# Patient Record
Sex: MALE | Race: WHITE | Employment: OTHER | URBAN - NONMETROPOLITAN AREA
[De-identification: names, ages, dates, MRNs, and addresses within clinical notes are randomized per-mention and may not be internally consistent; named-entity substitution may affect disease eponyms.]

---

## 2017-04-03 DIAGNOSIS — K22.70 BARRETT'S ESOPHAGUS WITHOUT DYSPLASIA: ICD-10-CM

## 2017-04-03 RX ORDER — OMEPRAZOLE 20 MG/1
CAPSULE, DELAYED RELEASE ORAL
Qty: 30 CAPSULE | Refills: 3 | Status: SHIPPED | OUTPATIENT
Start: 2017-04-03 | End: 2017-09-14 | Stop reason: SDUPTHER

## 2017-04-10 DIAGNOSIS — G47.00 INSOMNIA, UNSPECIFIED TYPE: ICD-10-CM

## 2017-04-10 RX ORDER — ALPRAZOLAM 0.5 MG/1
TABLET ORAL
Qty: 30 TABLET | Refills: 3 | Status: SHIPPED | OUTPATIENT
Start: 2017-04-10 | End: 2017-08-08 | Stop reason: SDUPTHER

## 2017-04-13 ENCOUNTER — OFFICE VISIT (OUTPATIENT)
Dept: FAMILY MEDICINE CLINIC | Age: 82
End: 2017-04-13

## 2017-04-13 VITALS
SYSTOLIC BLOOD PRESSURE: 118 MMHG | WEIGHT: 175.2 LBS | DIASTOLIC BLOOD PRESSURE: 76 MMHG | RESPIRATION RATE: 20 BRPM | TEMPERATURE: 98 F | HEART RATE: 74 BPM | HEIGHT: 67 IN | OXYGEN SATURATION: 98 % | BODY MASS INDEX: 27.5 KG/M2

## 2017-04-13 DIAGNOSIS — R97.20 ABNORMAL PSA: ICD-10-CM

## 2017-04-13 DIAGNOSIS — H26.9 CATARACT: ICD-10-CM

## 2017-04-13 DIAGNOSIS — N18.4 CHRONIC KIDNEY DISEASE (CKD), STAGE IV (SEVERE) (HCC): ICD-10-CM

## 2017-04-13 DIAGNOSIS — Z01.818 PREOP EXAMINATION: Primary | ICD-10-CM

## 2017-04-13 DIAGNOSIS — I48.0 PAROXYSMAL ATRIAL FIBRILLATION (HCC): ICD-10-CM

## 2017-04-13 DIAGNOSIS — I42.9 CARDIOMYOPATHY (HCC): ICD-10-CM

## 2017-04-13 PROCEDURE — 99213 OFFICE O/P EST LOW 20 MIN: CPT | Performed by: FAMILY MEDICINE

## 2017-04-13 ASSESSMENT — ENCOUNTER SYMPTOMS
CONSTIPATION: 0
SHORTNESS OF BREATH: 0
DIARRHEA: 0
BLOOD IN STOOL: 0

## 2017-08-08 DIAGNOSIS — G47.00 INSOMNIA, UNSPECIFIED TYPE: ICD-10-CM

## 2017-08-08 RX ORDER — ALPRAZOLAM 0.5 MG/1
TABLET ORAL
Qty: 30 TABLET | Refills: 3 | Status: SHIPPED | OUTPATIENT
Start: 2017-08-08 | End: 2017-12-05 | Stop reason: SDUPTHER

## 2017-08-14 ENCOUNTER — OFFICE VISIT (OUTPATIENT)
Dept: FAMILY MEDICINE CLINIC | Age: 82
End: 2017-08-14

## 2017-08-14 VITALS
WEIGHT: 171.4 LBS | DIASTOLIC BLOOD PRESSURE: 56 MMHG | BODY MASS INDEX: 26.9 KG/M2 | SYSTOLIC BLOOD PRESSURE: 120 MMHG | OXYGEN SATURATION: 97 % | HEIGHT: 67 IN | HEART RATE: 76 BPM

## 2017-08-14 DIAGNOSIS — I42.9 CARDIOMYOPATHY, UNSPECIFIED TYPE (HCC): ICD-10-CM

## 2017-08-14 DIAGNOSIS — N18.4 CHRONIC KIDNEY DISEASE (CKD), STAGE IV (SEVERE) (HCC): Primary | ICD-10-CM

## 2017-08-14 DIAGNOSIS — I50.9 CONGESTIVE HEART FAILURE, UNSPECIFIED CONGESTIVE HEART FAILURE CHRONICITY, UNSPECIFIED CONGESTIVE HEART FAILURE TYPE: ICD-10-CM

## 2017-08-14 DIAGNOSIS — I48.0 PAROXYSMAL ATRIAL FIBRILLATION (HCC): ICD-10-CM

## 2017-08-14 DIAGNOSIS — M1A.30X0 CHRONIC GOUT DUE TO RENAL IMPAIRMENT WITHOUT TOPHUS, UNSPECIFIED SITE: ICD-10-CM

## 2017-08-14 DIAGNOSIS — R97.20 ELEVATED PSA: ICD-10-CM

## 2017-08-14 PROCEDURE — 99213 OFFICE O/P EST LOW 20 MIN: CPT | Performed by: FAMILY MEDICINE

## 2017-08-14 ASSESSMENT — ENCOUNTER SYMPTOMS
CONSTIPATION: 0
DIARRHEA: 0
BLOOD IN STOOL: 0
SHORTNESS OF BREATH: 0

## 2017-08-14 ASSESSMENT — PATIENT HEALTH QUESTIONNAIRE - PHQ9
2. FEELING DOWN, DEPRESSED OR HOPELESS: 0
SUM OF ALL RESPONSES TO PHQ9 QUESTIONS 1 & 2: 0
SUM OF ALL RESPONSES TO PHQ QUESTIONS 1-9: 0
1. LITTLE INTEREST OR PLEASURE IN DOING THINGS: 0

## 2017-09-14 DIAGNOSIS — K22.70 BARRETT'S ESOPHAGUS WITHOUT DYSPLASIA: ICD-10-CM

## 2017-09-14 RX ORDER — OMEPRAZOLE 20 MG/1
CAPSULE, DELAYED RELEASE ORAL
Qty: 30 CAPSULE | Refills: 4 | Status: SHIPPED | OUTPATIENT
Start: 2017-09-14 | End: 2018-02-27 | Stop reason: SDUPTHER

## 2017-09-15 ENCOUNTER — HOSPITAL ENCOUNTER (OUTPATIENT)
Dept: SURGERY | Age: 82
Discharge: HOME | End: 2017-09-15
Attending: OPHTHALMOLOGY | Admitting: OPHTHALMOLOGY

## 2017-09-15 RX ORDER — PROPARACAINE HYDROCHLORIDE 5 MG/ML
1 SOLUTION/ DROPS OPHTHALMIC
Status: COMPLETED | OUTPATIENT
Start: 2017-09-15 | End: 2017-09-15

## 2017-09-15 RX ORDER — CYCLOPENTOLATE HYDROCHLORIDE 10 MG/ML
2 SOLUTION/ DROPS OPHTHALMIC EVERY 5 MIN PRN
Status: DISCONTINUED | OUTPATIENT
Start: 2017-09-15 | End: 2017-09-16 | Stop reason: HOSPADM

## 2017-09-15 RX ORDER — IBUPROFEN 200 MG
600 TABLET ORAL ONCE
Status: COMPLETED | OUTPATIENT
Start: 2017-09-15 | End: 2017-09-15

## 2017-09-15 RX ORDER — PHENYLEPHRINE HYDROCHLORIDE 100 MG/ML
1 SOLUTION/ DROPS OPHTHALMIC PRN
Status: DISCONTINUED | OUTPATIENT
Start: 2017-09-15 | End: 2017-09-16 | Stop reason: HOSPADM

## 2017-09-15 RX ADMIN — PROPARACAINE HYDROCHLORIDE 1 DROP: 5 SOLUTION/ DROPS OPHTHALMIC at 16:20

## 2017-09-15 RX ADMIN — CYCLOPENTOLATE HYDROCHLORIDE 2 DROP: 10 SOLUTION/ DROPS OPHTHALMIC at 15:37

## 2017-09-15 RX ADMIN — PHENYLEPHRINE HYDROCHLORIDE 1 DROP: 100 SOLUTION/ DROPS OPHTHALMIC at 15:23

## 2017-09-15 RX ADMIN — CYCLOPENTOLATE HYDROCHLORIDE 2 DROP: 10 SOLUTION/ DROPS OPHTHALMIC at 15:29

## 2017-09-15 RX ADMIN — PHENYLEPHRINE HYDROCHLORIDE 1 DROP: 100 SOLUTION/ DROPS OPHTHALMIC at 15:18

## 2017-09-15 RX ADMIN — Medication 600 MG: at 15:19

## 2017-09-15 RX ADMIN — CYCLOPENTOLATE HYDROCHLORIDE 2 DROP: 10 SOLUTION/ DROPS OPHTHALMIC at 15:21

## 2017-09-15 RX ADMIN — PHENYLEPHRINE HYDROCHLORIDE 1 DROP: 100 SOLUTION/ DROPS OPHTHALMIC at 15:33

## 2017-09-15 ASSESSMENT — PAIN SCALES - GENERAL: PAINLEVEL_OUTOF10: 0

## 2017-12-05 DIAGNOSIS — G47.00 INSOMNIA, UNSPECIFIED TYPE: ICD-10-CM

## 2017-12-05 RX ORDER — ALPRAZOLAM 0.5 MG/1
TABLET ORAL
Qty: 30 TABLET | Refills: 3 | Status: SHIPPED | OUTPATIENT
Start: 2017-12-05 | End: 2018-06-26 | Stop reason: DRUGHIGH

## 2017-12-11 RX ORDER — ALLOPURINOL 100 MG/1
TABLET ORAL
Qty: 90 TABLET | Refills: 3 | Status: SHIPPED | OUTPATIENT
Start: 2017-12-11 | End: 2018-01-25 | Stop reason: ALTCHOICE

## 2017-12-14 ENCOUNTER — OFFICE VISIT (OUTPATIENT)
Dept: FAMILY MEDICINE CLINIC | Age: 82
End: 2017-12-14

## 2017-12-14 VITALS
WEIGHT: 166.6 LBS | HEART RATE: 76 BPM | HEIGHT: 67 IN | OXYGEN SATURATION: 93 % | BODY MASS INDEX: 26.15 KG/M2 | SYSTOLIC BLOOD PRESSURE: 102 MMHG | DIASTOLIC BLOOD PRESSURE: 64 MMHG

## 2017-12-14 DIAGNOSIS — N18.4 CHRONIC KIDNEY DISEASE (CKD), STAGE IV (SEVERE) (HCC): Primary | ICD-10-CM

## 2017-12-14 DIAGNOSIS — R41.3 MEMORY LOSS: ICD-10-CM

## 2017-12-14 DIAGNOSIS — I50.20 SYSTOLIC CONGESTIVE HEART FAILURE, UNSPECIFIED CONGESTIVE HEART FAILURE CHRONICITY: ICD-10-CM

## 2017-12-14 DIAGNOSIS — I42.9 CARDIOMYOPATHY, UNSPECIFIED TYPE (HCC): ICD-10-CM

## 2017-12-14 DIAGNOSIS — I48.0 PAROXYSMAL ATRIAL FIBRILLATION (HCC): ICD-10-CM

## 2017-12-14 DIAGNOSIS — R97.20 ELEVATED PSA: ICD-10-CM

## 2017-12-14 DIAGNOSIS — K22.70 BARRETT'S ESOPHAGUS WITHOUT DYSPLASIA: ICD-10-CM

## 2017-12-14 PROCEDURE — 99214 OFFICE O/P EST MOD 30 MIN: CPT | Performed by: FAMILY MEDICINE

## 2017-12-14 RX ORDER — POLYETHYLENE GLYCOL 3350 17 G/17G
17 POWDER, FOR SOLUTION ORAL DAILY
COMMUNITY
End: 2018-01-25 | Stop reason: ALTCHOICE

## 2017-12-14 ASSESSMENT — ENCOUNTER SYMPTOMS
DIARRHEA: 0
NAUSEA: 0
SHORTNESS OF BREATH: 0

## 2017-12-14 NOTE — PROGRESS NOTES
Subjective:      Patient ID: Dina Tipton is a 80 y.o. male. Chief Complaint   Patient presents with    Atrial Fibrillation    Congestive Heart Failure    Chronic Kidney Disease   Pt is cold all of the time even when heat is set on 78. Wife thinks he has circulatory issues. Had fall recently. Blood vessel issues to right eye. Seen by eye drJn  Had laser surgery. Will have rpt laser on left eye for scar tissue. Last seen by cardio approx 5 mo ago. F/u appt planned next mo. No chest pain, sob or swelling noted. Has been on current meds. No recent exac of afib. Still on amiodarone. eduardo well. Has been having some issues w/ memory. Gradually getting worse. Family hx of dementia in sisters. eduardo meds for gerd. No sig symptoms. Last egd 1 yr ago  HPI    Review of Systems   Constitutional: Negative for fever. Respiratory: Negative for shortness of breath. Cardiovascular: Negative for chest pain, palpitations and leg swelling. Gastrointestinal: Negative for diarrhea and nausea. Musculoskeletal: Positive for gait problem. Psychiatric/Behavioral: Positive for decreased concentration. Objective:   Physical Exam   Constitutional: He is oriented to person, place, and time. He appears well-developed and well-nourished. HENT:   Head: Normocephalic and atraumatic. Eyes: Conjunctivae and EOM are normal.   Neck: No tracheal deviation present. Cardiovascular: Normal rate. Pulmonary/Chest: Effort normal. He has decreased breath sounds in the right lower field and the left lower field. Abdominal: Soft. There is no tenderness. Musculoskeletal: He exhibits no edema. Thoracic back: He exhibits deformity (kyphotic). Neurological: He is alert and oriented to person, place, and time. Skin: Skin is warm and dry. Psychiatric: He has a normal mood and affect.      /64   Pulse 76   Ht 5' 7\" (1.702 m)   Wt 166 lb 9.6 oz (75.6 kg)   SpO2 93%   BMI 26.09 kg/m²

## 2017-12-28 ENCOUNTER — OFFICE VISIT (OUTPATIENT)
Dept: FAMILY MEDICINE CLINIC | Age: 82
End: 2017-12-28

## 2017-12-28 VITALS
TEMPERATURE: 98 F | BODY MASS INDEX: 24.83 KG/M2 | DIASTOLIC BLOOD PRESSURE: 49 MMHG | SYSTOLIC BLOOD PRESSURE: 75 MMHG | WEIGHT: 158.2 LBS | HEART RATE: 72 BPM | OXYGEN SATURATION: 92 % | HEIGHT: 67 IN

## 2017-12-28 DIAGNOSIS — K52.9 ACUTE GASTROENTERITIS: ICD-10-CM

## 2017-12-28 DIAGNOSIS — I95.9 HYPOTENSION, UNSPECIFIED HYPOTENSION TYPE: Primary | ICD-10-CM

## 2017-12-28 DIAGNOSIS — I42.9 CARDIOMYOPATHY, UNSPECIFIED TYPE (HCC): ICD-10-CM

## 2017-12-28 DIAGNOSIS — J40 BRONCHITIS: ICD-10-CM

## 2017-12-28 DIAGNOSIS — N18.4 CHRONIC KIDNEY DISEASE (CKD), STAGE IV (SEVERE) (HCC): ICD-10-CM

## 2017-12-28 PROCEDURE — 99214 OFFICE O/P EST MOD 30 MIN: CPT | Performed by: FAMILY MEDICINE

## 2017-12-28 ASSESSMENT — ENCOUNTER SYMPTOMS
DIARRHEA: 1
SHORTNESS OF BREATH: 0
VOMITING: 1
COUGH: 1

## 2017-12-28 NOTE — PROGRESS NOTES
Subjective:      Patient ID: Lilly Postal is a 80 y.o. male. Chief Complaint   Patient presents with    Influenza    Diarrhea    Cough       Diarrhea    This is a new problem. The current episode started in the past 7 days. The problem has been unchanged. Associated symptoms include coughing and vomiting. Pertinent negatives include no fever. Cough   This is a new problem. The current episode started in the past 7 days. The cough is productive of sputum. Associated symptoms include nasal congestion and postnasal drip. Pertinent negatives include no fever or shortness of breath. He has tried nothing for the symptoms. last seen by cardio in July. Has had dec appetite and dec intake along w/ dec urine output. Review of Systems   Constitutional: Positive for appetite change and fatigue. Negative for fever. HENT: Positive for congestion and postnasal drip. Respiratory: Positive for cough. Negative for shortness of breath. Cardiovascular: Negative for palpitations. Gastrointestinal: Positive for diarrhea and vomiting. Neurological: Positive for dizziness. Negative for syncope.      Past Medical History:   Diagnosis Date    Arthritis     Atrial fibrillation (HCC) atrial fibrillation, pacer    Cardiomyopathy [facility]     Cataract     Chronic kidney disease (CKD), stage IV (severe) (HCC)     Congestive heart failure (CHF) ([facility] [facility]     Gout      Current Outpatient Prescriptions on File Prior to Visit   Medication Sig Dispense Refill    polyethylene glycol (MIRALAX) powder Take 17 g by mouth daily      allopurinol (ZYLOPRIM) 100 MG tablet TAKE 1 TABLET BY MOUTH EVERY DAY 90 tablet 3    ALPRAZolam (XANAX) 0.5 MG tablet TAKE 1 TABLET BY MOUTH NIGHTLY AS NEEDED FOR ANXIETY 30 tablet 3    omeprazole (PRILOSEC) 20 MG delayed release capsule TAKE 1 CAPSULE BY MOUTH DAILY 30 capsule 4    vitamin B-12 (CYANOCOBALAMIN) 500 MCG tablet Take 500 mcg by mouth daily      hydrALAZINE (APRESOLINE) 10 MG tablet Take 30 mg by mouth 3 times daily      isosorbide dinitrate (ISORDIL) 10 MG tablet Take 10 mg by mouth 3 times daily      metoprolol (LOPRESSOR) 100 MG tablet Take 100 mg by mouth 2 times daily      aspirin 325 MG tablet Take 325 mg by mouth daily      bimatoprost (LUMIGAN) 0.01 % SOLN ophthalmic drops Place 1 drop into the left eye nightly      furosemide (LASIX) 20 MG tablet Take 20 mg by mouth daily.  amiodarone (CORDARONE) 200 MG tablet Take 100 mg by mouth daily        No current facility-administered medications on file prior to visit. Past Surgical History:   Procedure Laterality Date    APPENDECTOMY      CARDIAC DEFIBRILLATOR PLACEMENT      CARDIAC SURGERY Left     5/13/17    CATARACT REMOVAL WITH IMPLANT Right 06/17/2017    [facility]    CHOLECYSTECTOMY      HERNIA REPAIR Bilateral     PACEMAKER INSERTION      TONSILLECTOMY       Objective:   Physical Exam   Constitutional: He is oriented to person, place, and time. He appears well-developed and well-nourished. HENT:   Head: Normocephalic and atraumatic. Mouth/Throat: Uvula is midline. Mucous membranes are dry. Eyes: Conjunctivae and EOM are normal.   Neck: No tracheal deviation present. Cardiovascular: Normal rate. Pulmonary/Chest: Effort normal. He has decreased breath sounds in the right lower field and the left lower field. He has rhonchi in the right lower field and the left lower field. He has rales in the right lower field and the left lower field. Abdominal: Soft. There is no tenderness. Musculoskeletal: He exhibits no edema. Thoracic back: He exhibits deformity (kyphotic). Neurological: He is alert and oriented to person, place, and time. Skin: Skin is warm and dry. Psychiatric: He has a normal mood and affect. BP (!) 75/49   Pulse 72   Temp 98 °F (36.7 °C) (Tympanic)   Ht 5' 7\" (1.702 m)   Wt 158 lb 3.2 oz (71.8 kg)   SpO2 92%   BMI 24.78 kg/m²    Assessment/Plan   1. Hypotension, unspecified hypotension type  bp low on multiple checks. Given hx of ckd and chf, as well as dec urine output and dec intake, rec ER for further eval and treatment. Patient typically on bp meds. 2. Acute gastroenteritis  W/ some emesis and diarrhea. Suspect contrib to above. Suspect volume depletion    3. Bronchitis  W/ cough. Abn breath sounds. May benefit from cxr as well    4. Chronic kidney disease (CKD), stage IV (severe) (HCC)  Given ongoing issues, rec eval at ER. 5. Cardiomyopathy, unspecified type ([address]  Last documented EF 33% per cardio note. May be contrib to above        [facility] ER and gave report. Pt sent to [facility] for further eval and treatment.

## 2018-01-08 ENCOUNTER — TELEPHONE (OUTPATIENT)
Dept: ADMINISTRATIVE | Age: 83
End: 2018-01-08

## 2018-01-25 ENCOUNTER — TELEPHONE (OUTPATIENT)
Dept: FAMILY MEDICINE CLINIC | Age: 83
End: 2018-01-25

## 2018-01-25 ENCOUNTER — OFFICE VISIT (OUTPATIENT)
Dept: FAMILY MEDICINE CLINIC | Age: 83
End: 2018-01-25

## 2018-01-25 VITALS
HEIGHT: 67 IN | DIASTOLIC BLOOD PRESSURE: 62 MMHG | HEART RATE: 79 BPM | OXYGEN SATURATION: 91 % | SYSTOLIC BLOOD PRESSURE: 86 MMHG

## 2018-01-25 DIAGNOSIS — I42.9 CARDIOMYOPATHY, UNSPECIFIED TYPE (HCC): ICD-10-CM

## 2018-01-25 DIAGNOSIS — I95.9 HYPOTENSION, UNSPECIFIED HYPOTENSION TYPE: ICD-10-CM

## 2018-01-25 DIAGNOSIS — I48.0 PAROXYSMAL ATRIAL FIBRILLATION (HCC): ICD-10-CM

## 2018-01-25 DIAGNOSIS — R26.9 GAIT ABNORMALITY: ICD-10-CM

## 2018-01-25 DIAGNOSIS — J18.9 PNEUMONIA DUE TO INFECTIOUS ORGANISM, UNSPECIFIED LATERALITY, UNSPECIFIED PART OF LUNG: Primary | ICD-10-CM

## 2018-01-25 DIAGNOSIS — R19.7 DIARRHEA, UNSPECIFIED TYPE: ICD-10-CM

## 2018-01-25 DIAGNOSIS — I50.20 SYSTOLIC CONGESTIVE HEART FAILURE, UNSPECIFIED CONGESTIVE HEART FAILURE CHRONICITY: ICD-10-CM

## 2018-01-25 DIAGNOSIS — N18.4 CHRONIC KIDNEY DISEASE (CKD), STAGE IV (SEVERE) (HCC): ICD-10-CM

## 2018-01-25 DIAGNOSIS — I50.22 HEART FAILURE, SYSTOLIC, CHRONIC (HCC): ICD-10-CM

## 2018-01-25 DIAGNOSIS — R29.6 FREQUENT FALLS: ICD-10-CM

## 2018-01-25 LAB
A/G RATIO: 0.6 (ref 1–2.5)
ALBUMIN SERPL-MCNC: 2.6 G/DL (ref 3.6–5)
ALP BLD-CCNC: 67 U/L (ref 46–116)
ALT SERPL-CCNC: 17 U/L (ref 12–78)
ANION GAP SERPL CALCULATED.3IONS-SCNC: 17.6 MMOL/L (ref 8–16)
AST SERPL-CCNC: 39 U/L (ref 12–36)
BANDED NEUTROPHILS ABSOLUTE COUNT: 17 % (ref 0–4)
BASOPHILS ABSOLUTE: 0 % (ref 0–1)
BASOPHILS RELATIVE PERCENT: 0.6 % (ref 0–1)
BILIRUB SERPL-MCNC: 1 MG/DL (ref 0–1.1)
BUN BLDV-MCNC: 50 MG/DL (ref 5–19)
CALCIUM SERPL-MCNC: 9 MG/DL (ref 8.4–10.2)
CHLORIDE BLD-SCNC: 101 MMOL/L (ref 99–111)
CO2: 24 MMOL/L (ref 21–33)
CREAT SERPL-MCNC: 3.4 MG/DL (ref 0.6–1.3)
EOSINOPHILS ABSOLUTE: 0 % (ref 0–5)
EOSINOPHILS RELATIVE PERCENT: 0.1 % (ref 0–5)
ERYTHROCYTE DISTRIBUTION WIDTH RBC RATIO: 14.1 % (ref 11.6–14.8)
GFR CALCULATED: 17
GLOBULIN: 4.2 G/DL (ref 2–3.5)
GLUCOSE BLD-MCNC: 142 MG/DL (ref 74–99)
GRANULOCYTE ABSOLUTE COUNT: 16.5 X(10)3/UL (ref 1.8–7.2)
GRANULOCYTE ABSOLUTE COUNT: 62 % (ref 40–70)
HCT VFR BLD CALC: 37.5 % (ref 37.4–53.8)
HEMOGLOBIN: 12.4 G/DL (ref 13.2–16.5)
IMMATURE GRANULOCYTES %: 1.7 % (ref 0–0.5)
LYMPHOCYTES ABSOLUTE: 1.7 X(10)3/UL (ref 1.1–2.7)
LYMPHOCYTES ABSOLUTE: 6 % (ref 15–45)
LYMPHOCYTES RELATIVE PERCENT: 7.1 % (ref 15–45)
MCH RBC QN AUTO: 32.9 PG (ref 27.7–33.3)
MCHC RBC AUTO-ENTMCNC: 33.1 G/DL (ref 32.8–35)
MCV RBC AUTO: 99.5 FL (ref 80.5–96.9)
MONOCYTES ABSOLUTE: 15 % (ref 0–12)
MONOCYTES RELATIVE PERCENT: 20.8 % (ref 0–12)
NEUTROPHILS/100 LEUKOCYTES: 69.7 % (ref 40–70)
PLATELETS: 268 X1000 (ref 129–332)
PLATELETS: NORMAL
POTASSIUM SERPL-SCNC: 4 MMOL/L (ref 3.4–5)
RBC # BLD: 3.77 X1000000 (ref 4.16–5.62)
RBC # BLD: NORMAL 10*6/UL
SODIUM BLD-SCNC: 139 MMOL/L (ref 136–146)
TOTAL PROTEIN: 6.8 G/DL (ref 6.3–8)
WBC # BLD: 23.7 X1000 (ref 5.4–9.9)

## 2018-01-25 PROCEDURE — 99214 OFFICE O/P EST MOD 30 MIN: CPT | Performed by: FAMILY MEDICINE

## 2018-01-25 ASSESSMENT — ENCOUNTER SYMPTOMS
BACK PAIN: 1
DIARRHEA: 1
SHORTNESS OF BREATH: 0
NAUSEA: 0
VOMITING: 0

## 2018-01-25 NOTE — PROGRESS NOTES
benefit from further rehab stay to dec risk of falls and further injury and help address the issues listed below    2. Diarrhea, unspecified type  Recent hospitalization and abx. Diarrhea as listed. Denies any testing for cdiff. Order for cdiff given. Has tried loperamide without relief  - C DIFF TOXIN/ANTIGEN; Future  - Comprehensive Metabolic Panel; Future  - CBC Auto Differential; Future    3. Frequent falls  See above. Diff w/ gait. Had falls at prior rehab facility as well. Sig fall risk at home. Would benefit from additional, more intense therapy. 4. Gait abnormality  See above    5. Chronic kidney disease (CKD), stage IV (severe) ([facility] [address]  Rpt labs. Baseline creat approx 2.4. Had been worse at times in hospital.  Worry about volume depletion given dry mucus membranes, diarrhea, dec appetite    6. Paroxysmal atrial fibrillation (HCC)  Cont meds. Seems to be in nsr today    7. Systolic congestive heart failure, unspecified congestive heart failure chronicity (HCC)  No sig edema today. Issues as listed above. 8. Cardiomyopathy, unspecified type ([facility] [address]  See above    10. Hypotension, unspecified hypotension type  Hold hydralazine for now. May need additional IVF as above. Likely contrib to weakness. See above.      spoke w/  at [facility] x 2. Exploring the option of additional stay at a rehab facility for the issues listed above. Janeen Gomez MA, am scribing for and in the presence of Karol Cloud MD. Electronically signed by Radha Floyd MA on 1/25/2018 at 2:20 PM    I, Karol Cloud MD  personally performed the services described in this documentation, as scribed by the user listed above in my presence, and it is both accurate and complete. I agree with the Chief Complaint, ROS, and Past Histories independently gathered by the clinical support staff and the remaining scribed note accurately describes my personal service to the patient.     1/25/2018    2:24 PM

## 2018-01-25 NOTE — TELEPHONE ENCOUNTER
Called and informed Martita Kathleen at [id number] [facility] of critical lab result. Will fax result to [phone] when we get all the results.

## 2018-02-27 DIAGNOSIS — K22.70 BARRETT'S ESOPHAGUS WITHOUT DYSPLASIA: ICD-10-CM

## 2018-02-27 RX ORDER — OMEPRAZOLE 20 MG/1
CAPSULE, DELAYED RELEASE ORAL
Qty: 30 CAPSULE | Refills: 4 | Status: SHIPPED | OUTPATIENT
Start: 2018-02-27 | End: 2018-04-10 | Stop reason: SDUPTHER

## 2018-03-27 ENCOUNTER — TELEPHONE (OUTPATIENT)
Dept: FAMILY MEDICINE CLINIC | Age: 83
End: 2018-03-27

## 2018-03-27 NOTE — TELEPHONE ENCOUNTER
Patient is being released from nursing home Saturday 03/31/2018 to go home she is going to try to care for him at home if she feels its too much he will need placed in a long term care facility in [address] [place] [address].  She wanted you to know that he still has C diff after 50 days of treatment and is still being treted

## 2018-03-27 NOTE — TELEPHONE ENCOUNTER
Wife wants to wait on the GI referral.   She wants to get him home and see if this last treatment plan even works. CHRISTOPHER was seeing him in the home care facility. She said that he'll be on an AB until the end of April. She will discuss this with you during his visit.

## 2018-04-10 ENCOUNTER — OFFICE VISIT (OUTPATIENT)
Dept: FAMILY MEDICINE CLINIC | Age: 83
End: 2018-04-10

## 2018-04-10 VITALS
BODY MASS INDEX: 23.7 KG/M2 | HEIGHT: 67 IN | HEART RATE: 79 BPM | DIASTOLIC BLOOD PRESSURE: 58 MMHG | OXYGEN SATURATION: 92 % | SYSTOLIC BLOOD PRESSURE: 90 MMHG | WEIGHT: 151 LBS

## 2018-04-10 DIAGNOSIS — R21 RASH: ICD-10-CM

## 2018-04-10 DIAGNOSIS — I48.0 PAROXYSMAL ATRIAL FIBRILLATION (HCC): Primary | ICD-10-CM

## 2018-04-10 DIAGNOSIS — I50.20 SYSTOLIC CONGESTIVE HEART FAILURE, UNSPECIFIED CONGESTIVE HEART FAILURE CHRONICITY: ICD-10-CM

## 2018-04-10 DIAGNOSIS — K22.70 BARRETT'S ESOPHAGUS WITHOUT DYSPLASIA: ICD-10-CM

## 2018-04-10 DIAGNOSIS — I42.9 CARDIOMYOPATHY, UNSPECIFIED TYPE (HCC): ICD-10-CM

## 2018-04-10 DIAGNOSIS — A04.72 C. DIFFICILE COLITIS: ICD-10-CM

## 2018-04-10 DIAGNOSIS — N18.4 CHRONIC KIDNEY DISEASE (CKD), STAGE IV (SEVERE) (HCC): ICD-10-CM

## 2018-04-10 PROCEDURE — 99214 OFFICE O/P EST MOD 30 MIN: CPT | Performed by: FAMILY MEDICINE

## 2018-04-10 RX ORDER — MIRTAZAPINE 30 MG/1
30 TABLET, FILM COATED ORAL NIGHTLY
COMMUNITY
End: 2018-06-04 | Stop reason: SDUPTHER

## 2018-04-10 RX ORDER — ISOSORBIDE DINITRATE 10 MG/1
10 TABLET ORAL 3 TIMES DAILY
Qty: 90 TABLET | Refills: 3 | Status: SHIPPED | OUTPATIENT
Start: 2018-04-10 | End: 2018-09-18

## 2018-04-10 RX ORDER — METRONIDAZOLE 500 MG/1
500 TABLET ORAL 2 TIMES DAILY
COMMUNITY
End: 2018-07-20 | Stop reason: ALTCHOICE

## 2018-04-10 RX ORDER — DONEPEZIL HYDROCHLORIDE 5 MG/1
5 TABLET, FILM COATED ORAL NIGHTLY
COMMUNITY
End: 2018-05-29 | Stop reason: SDUPTHER

## 2018-04-10 RX ORDER — AMMONIUM LACTATE 12 G/100G
LOTION TOPICAL
Qty: 1 BOTTLE | Refills: 1 | Status: SHIPPED | OUTPATIENT
Start: 2018-04-10 | End: 2018-11-08 | Stop reason: ALTCHOICE

## 2018-04-10 RX ORDER — OMEPRAZOLE 20 MG/1
CAPSULE, DELAYED RELEASE ORAL
Qty: 30 CAPSULE | Refills: 5 | Status: SHIPPED | OUTPATIENT
Start: 2018-04-10 | End: 2018-12-13 | Stop reason: SDUPTHER

## 2018-04-10 RX ORDER — POTASSIUM CHLORIDE 750 MG/1
10 TABLET, FILM COATED, EXTENDED RELEASE ORAL 3 TIMES DAILY
Qty: 60 TABLET | Refills: 3 | Status: SHIPPED | OUTPATIENT
Start: 2018-04-10 | End: 2019-01-04 | Stop reason: SDUPTHER

## 2018-04-10 RX ORDER — POTASSIUM CHLORIDE 750 MG/1
10 TABLET, FILM COATED, EXTENDED RELEASE ORAL 3 TIMES DAILY
COMMUNITY
End: 2018-04-10 | Stop reason: SDUPTHER

## 2018-04-10 ASSESSMENT — ENCOUNTER SYMPTOMS
DIARRHEA: 0
CONSTIPATION: 0
SHORTNESS OF BREATH: 0

## 2018-04-13 DIAGNOSIS — D64.9 LOW HEMOGLOBIN AND LOW HEMATOCRIT: Primary | ICD-10-CM

## 2018-04-13 LAB
ABSOLUTE BASO #: 0.1 10^3/UL
ABSOLUTE EOS #: 0.7 10^3/UL
ABSOLUTE LYMPH #: 1.2 10^3/UL
ABSOLUTE MONO #: 1 10^3/UL
ABSOLUTE NEUT #: 6.5 10^3/UL
AGE TIME: 83 YRS
ALBUMIN SERPL-MCNC: 2.7 G/DL
ALP BLD-CCNC: 41 U/L
ALT SERPL-CCNC: 10 U/L
ANION GAP SERPL CALCULATED.3IONS-SCNC: 12 MMOL/L
AST SERPL-CCNC: 15 U/L
BASOPHILS RELATIVE PERCENT: 1.3 %
BILIRUB SERPL-MCNC: 0.52 MG/DL
BUN BLDV-MCNC: 27 MG/DL
C DIFFICILE TOXIN, EIA: NEGATIVE
CALCIUM SERPL-MCNC: 8.3 MG/DL
CBC AUTO DIF: ABNORMAL
CHLORIDE BLD-SCNC: 106 MMOL/L
CO2: 25.5 MMOL/L
COMPREHENSIVE METABOLIC PANEL: ABNORMAL
CREAT SERPL-MCNC: 1.79 MG/DL
DIFFERENTIAL, MANUAL: ABNORMAL
EOSINOPHILS ABSOLUTE: 7.5 %
FERRITIN: 347 NG/ML
GFR AFRICAN AMERICAN: 44 ML/MIN
GFR NON-AFRICAN AMERICAN: 36 ML/MIN
GLUCOSE BLD-MCNC: 108 MG/DL
HCT VFR BLD CALC: 31.5 %
HEMOGLOBIN: 9.9 G/DL
IRON SATURATION: 36
IRON: 37 UG/DL
LEUKOCYTES, BLD: 9.5 K/UL
LYMPHOCYTES ABSOLUTE: 12.3 %
MCHC RBC AUTO-ENTMCNC: 29.4 PG
MCHC RBC AUTO-ENTMCNC: 31.5 G/DL
MCV RBC AUTO: 93.2 FL
MONOCYTES ABSOLUTE: 10.7 %
NEUTROPHILS SEGMENTED: 68.2 %
PDW BLD-RTO: 16.9 %
PLATELET # BLD: 311 K/UL
PMV BLD AUTO: 8 FL
POTASSIUM SERPL-SCNC: 4.4 MMOL/L
RBC # BLD: 3.39 M/UL
SEND TO IFC: NO
SODIUM BLD-SCNC: 143 MMOL/L
TOTAL IRON BINDING CAPACITY: 104 UG/DL
TOTAL PROTEIN: 7.1 G/DL
VITAMIN B-12: 837 PG/ML

## 2018-05-23 ENCOUNTER — OFFICE VISIT (OUTPATIENT)
Dept: FAMILY MEDICINE CLINIC | Age: 83
End: 2018-05-23

## 2018-05-23 VITALS
SYSTOLIC BLOOD PRESSURE: 134 MMHG | DIASTOLIC BLOOD PRESSURE: 82 MMHG | BODY MASS INDEX: 24.01 KG/M2 | HEIGHT: 67 IN | HEART RATE: 94 BPM | WEIGHT: 153 LBS | OXYGEN SATURATION: 91 %

## 2018-05-23 DIAGNOSIS — I48.0 PAROXYSMAL ATRIAL FIBRILLATION (HCC): Primary | ICD-10-CM

## 2018-05-23 DIAGNOSIS — R60.0 LOCALIZED EDEMA: ICD-10-CM

## 2018-05-23 DIAGNOSIS — I42.9 CARDIOMYOPATHY, UNSPECIFIED TYPE (HCC): ICD-10-CM

## 2018-05-23 DIAGNOSIS — N18.4 CHRONIC KIDNEY DISEASE (CKD), STAGE IV (SEVERE) (HCC): ICD-10-CM

## 2018-05-23 DIAGNOSIS — I82.403 ACUTE DEEP VEIN THROMBOSIS (DVT) OF BOTH LOWER EXTREMITIES, UNSPECIFIED VEIN (HCC): ICD-10-CM

## 2018-05-23 DIAGNOSIS — A04.72 C. DIFFICILE COLITIS: ICD-10-CM

## 2018-05-23 DIAGNOSIS — J44.9 CHRONIC OBSTRUCTIVE PULMONARY DISEASE, UNSPECIFIED COPD TYPE (HCC): ICD-10-CM

## 2018-05-23 PROCEDURE — 99213 OFFICE O/P EST LOW 20 MIN: CPT | Performed by: FAMILY MEDICINE

## 2018-05-23 RX ORDER — AMIODARONE HYDROCHLORIDE 100 MG/1
100 TABLET ORAL DAILY
COMMUNITY
End: 2018-05-30 | Stop reason: SDUPTHER

## 2018-05-23 RX ORDER — ACETAMINOPHEN 325 MG/1
650 TABLET ORAL EVERY 4 HOURS PRN
COMMUNITY
End: 2020-06-18

## 2018-05-23 RX ORDER — FUROSEMIDE 20 MG/1
20 TABLET ORAL DAILY
Qty: 30 TABLET | Refills: 0 | Status: SHIPPED | OUTPATIENT
Start: 2018-05-23 | End: 2018-06-25 | Stop reason: SDUPTHER

## 2018-05-23 RX ORDER — VANCOMYCIN HYDROCHLORIDE 125 MG/1
125 CAPSULE ORAL 4 TIMES DAILY
Qty: 40 CAPSULE | Refills: 0 | Status: SHIPPED | OUTPATIENT
Start: 2018-05-23 | End: 2018-06-02

## 2018-05-23 ASSESSMENT — ENCOUNTER SYMPTOMS
CHEST TIGHTNESS: 0
ABDOMINAL PAIN: 0
CONSTIPATION: 0
BLOOD IN STOOL: 0
DIARRHEA: 1
SHORTNESS OF BREATH: 0
COLOR CHANGE: 0

## 2018-05-29 ENCOUNTER — TELEPHONE (OUTPATIENT)
Dept: FAMILY MEDICINE CLINIC | Age: 83
End: 2018-05-29

## 2018-05-29 RX ORDER — DONEPEZIL HYDROCHLORIDE 5 MG/1
5 TABLET, FILM COATED ORAL NIGHTLY
Qty: 30 TABLET | Refills: 5 | Status: SHIPPED | OUTPATIENT
Start: 2018-05-29 | End: 2018-12-24 | Stop reason: SDUPTHER

## 2018-05-30 RX ORDER — AMIODARONE HYDROCHLORIDE 200 MG/1
TABLET ORAL
Qty: 15 TABLET | Refills: 0 | Status: SHIPPED | OUTPATIENT
Start: 2018-05-30 | End: 2018-06-29 | Stop reason: SDUPTHER

## 2018-06-04 ENCOUNTER — TELEPHONE (OUTPATIENT)
Dept: FAMILY MEDICINE CLINIC | Age: 83
End: 2018-06-04

## 2018-06-04 RX ORDER — MIRTAZAPINE 30 MG/1
30 TABLET, FILM COATED ORAL NIGHTLY
Qty: 30 TABLET | Refills: 3 | Status: ON HOLD | OUTPATIENT
Start: 2018-06-04 | End: 2018-09-24 | Stop reason: HOSPADM

## 2018-06-05 ENCOUNTER — TELEPHONE (OUTPATIENT)
Dept: FAMILY MEDICINE CLINIC | Age: 83
End: 2018-06-05

## 2018-06-12 LAB
ABSOLUTE BASO #: 0.2 10^3/UL
ABSOLUTE EOS #: 0.5 10^3/UL
ABSOLUTE LYMPH #: 1.2 10^3/UL
ABSOLUTE MONO #: 1 10^3/UL
ABSOLUTE NEUT #: 6.2 10^3/UL
BASOPHILS RELATIVE PERCENT: 2 %
CBC AUTO DIF: ABNORMAL
DIFFERENTIAL, MANUAL: ABNORMAL
EOSINOPHILS ABSOLUTE: 5.1 %
HCT VFR BLD CALC: 31.2 %
HEMOGLOBIN: 10.3 G/DL
LEUKOCYTES, BLD: 9.1 K/UL
LYMPHOCYTES ABSOLUTE: 13.7 %
MCHC RBC AUTO-ENTMCNC: 30.5 PG
MCHC RBC AUTO-ENTMCNC: 33 G/DL
MCV RBC AUTO: 92.5 FL
MONOCYTES ABSOLUTE: 11.1 %
NEUTROPHILS SEGMENTED: 68.1 %
PDW BLD-RTO: 17.9 %
PLATELET # BLD: 305 K/UL
PMV BLD AUTO: 8 FL
POTASSIUM SERPL-SCNC: 4.4 MMOL/L
RBC # BLD: 3.37 M/UL

## 2018-06-14 ENCOUNTER — TELEPHONE (OUTPATIENT)
Dept: FAMILY MEDICINE CLINIC | Age: 83
End: 2018-06-14

## 2018-06-14 DIAGNOSIS — N18.4 CHRONIC KIDNEY DISEASE (CKD), STAGE IV (SEVERE) (HCC): Primary | ICD-10-CM

## 2018-06-14 DIAGNOSIS — E87.8 POTASSIUM DISORDER: ICD-10-CM

## 2018-06-19 ENCOUNTER — TELEPHONE (OUTPATIENT)
Dept: FAMILY MEDICINE CLINIC | Age: 83
End: 2018-06-19

## 2018-06-21 ENCOUNTER — OFFICE VISIT (OUTPATIENT)
Dept: FAMILY MEDICINE CLINIC | Age: 83
End: 2018-06-21

## 2018-06-21 VITALS
BODY MASS INDEX: 24.71 KG/M2 | WEIGHT: 157.4 LBS | SYSTOLIC BLOOD PRESSURE: 110 MMHG | HEART RATE: 72 BPM | HEIGHT: 67 IN | DIASTOLIC BLOOD PRESSURE: 62 MMHG | OXYGEN SATURATION: 91 %

## 2018-06-21 DIAGNOSIS — N18.4 CHRONIC KIDNEY DISEASE (CKD), STAGE IV (SEVERE) (HCC): Primary | ICD-10-CM

## 2018-06-21 DIAGNOSIS — A04.72 C. DIFFICILE COLITIS: ICD-10-CM

## 2018-06-21 DIAGNOSIS — M1A.30X0 CHRONIC GOUT DUE TO RENAL IMPAIRMENT WITHOUT TOPHUS, UNSPECIFIED SITE: ICD-10-CM

## 2018-06-21 DIAGNOSIS — N39.0 URINARY TRACT INFECTION WITHOUT HEMATURIA, SITE UNSPECIFIED: ICD-10-CM

## 2018-06-21 LAB
A/G RATIO: 0.7 (ref 1–2.5)
ALBUMIN SERPL-MCNC: 2.9 G/DL (ref 3.6–5)
ALP BLD-CCNC: 50 U/L (ref 46–116)
ALT SERPL-CCNC: 11 U/L (ref 12–78)
ANION GAP SERPL CALCULATED.3IONS-SCNC: 15.7 MMOL/L (ref 8–16)
AST SERPL-CCNC: 14 U/L (ref 12–36)
BASOPHILS RELATIVE PERCENT: 0.3 % (ref 0–1)
BILIRUB SERPL-MCNC: 0.3 MG/DL (ref 0–1.1)
BUN BLDV-MCNC: 42 MG/DL (ref 5–19)
CALCIUM SERPL-MCNC: 8.6 MG/DL (ref 8.4–10.2)
CHLORIDE BLD-SCNC: 107 MMOL/L (ref 99–111)
CO2: 21 MMOL/L (ref 21–33)
CREAT SERPL-MCNC: 2.2 MG/DL (ref 0.6–1.3)
EOSINOPHILS RELATIVE PERCENT: 0.1 % (ref 0–5)
ERYTHROCYTE DISTRIBUTION WIDTH RBC RATIO: 15.4 % (ref 11.6–14.8)
GFR CALCULATED: 29
GLOBULIN: 4.1 G/DL (ref 2–3.5)
GLUCOSE BLD-MCNC: 127 MG/DL (ref 74–99)
GRANULOCYTE ABSOLUTE COUNT: 10.7 X(10)3/UL (ref 1.8–7.2)
HCT VFR BLD CALC: 32 % (ref 37.4–53.8)
HEMOGLOBIN: 10.4 G/DL (ref 13.2–16.5)
IMMATURE GRANULOCYTES %: 2.2 % (ref 0–0.5)
LYMPHOCYTES ABSOLUTE: 1 X(10)3/UL (ref 1.1–2.7)
LYMPHOCYTES RELATIVE PERCENT: 7.6 % (ref 15–45)
MCH RBC QN AUTO: 30.9 PG (ref 27.7–33.3)
MCHC RBC AUTO-ENTMCNC: 32.5 G/DL (ref 32.8–35)
MCV RBC AUTO: 95 FL (ref 80.5–96.9)
MONOCYTES RELATIVE PERCENT: 7.1 % (ref 0–12)
NEUTROPHILS/100 LEUKOCYTES: 82.7 % (ref 40–70)
PLATELETS: 334 X1000 (ref 129–332)
POTASSIUM SERPL-SCNC: 3.5 MMOL/L (ref 3.4–5)
RBC # BLD: 3.37 X1000000 (ref 4.16–5.62)
SODIUM BLD-SCNC: 140 MMOL/L (ref 136–146)
TOTAL PROTEIN: 7 G/DL (ref 6.3–8)
WBC # BLD: 12.9 X1000 (ref 5.4–9.9)

## 2018-06-21 PROCEDURE — 99214 OFFICE O/P EST MOD 30 MIN: CPT | Performed by: FAMILY MEDICINE

## 2018-06-21 RX ORDER — PREDNISONE 20 MG/1
20 TABLET ORAL
COMMUNITY
Start: 2018-06-19 | End: 2018-08-01 | Stop reason: ALTCHOICE

## 2018-06-21 RX ORDER — CIPROFLOXACIN 250 MG/1
250 TABLET, FILM COATED ORAL 2 TIMES DAILY
Qty: 14 TABLET | Refills: 0 | Status: CANCELLED | OUTPATIENT
Start: 2018-06-21 | End: 2018-06-28

## 2018-06-21 RX ORDER — CEFDINIR 300 MG/1
300 CAPSULE ORAL DAILY
Qty: 10 CAPSULE | Refills: 0 | Status: SHIPPED | OUTPATIENT
Start: 2018-06-21 | End: 2018-07-01

## 2018-06-21 RX ORDER — COLCHICINE 0.6 MG/1
0.6 TABLET, FILM COATED ORAL DAILY
COMMUNITY
Start: 2018-06-19 | End: 2018-08-01

## 2018-06-21 ASSESSMENT — ENCOUNTER SYMPTOMS
CHEST TIGHTNESS: 0
DIARRHEA: 1
ABDOMINAL PAIN: 0
BLOOD IN STOOL: 0
COLOR CHANGE: 0
SHORTNESS OF BREATH: 0
CONSTIPATION: 0

## 2018-06-25 ENCOUNTER — TELEPHONE (OUTPATIENT)
Dept: FAMILY MEDICINE CLINIC | Age: 83
End: 2018-06-25

## 2018-06-25 DIAGNOSIS — F41.9 ANXIETY: Primary | ICD-10-CM

## 2018-06-25 DIAGNOSIS — I42.9 CARDIOMYOPATHY, UNSPECIFIED TYPE (HCC): ICD-10-CM

## 2018-06-25 RX ORDER — FUROSEMIDE 20 MG/1
20 TABLET ORAL DAILY
Qty: 30 TABLET | Refills: 0 | Status: SHIPPED | OUTPATIENT
Start: 2018-06-25 | End: 2018-07-18 | Stop reason: SDUPTHER

## 2018-06-26 PROBLEM — F41.9 ANXIETY: Status: ACTIVE | Noted: 2018-06-26

## 2018-06-26 LAB
ABSOLUTE BASO #: 0 10^3/UL
ABSOLUTE EOS #: 0 10^3/UL
ABSOLUTE LYMPH #: 2 10^3/UL
ABSOLUTE MONO #: 0.4 10^3/UL
ABSOLUTE NEUT #: 7.7 10^3/UL
AGE TIME: 84 YRS
ALBUMIN SERPL-MCNC: 3 G/DL
ALP BLD-CCNC: 35 U/L
ALT SERPL-CCNC: 14 U/L
ANION GAP SERPL CALCULATED.3IONS-SCNC: 10 MMOL/L
AST SERPL-CCNC: 19 U/L
BANDED NEUTROPHILS ABSOLUTE COUNT: 2 %
BASOPHILS RELATIVE PERCENT: 0 %
BILIRUB SERPL-MCNC: 0.36 MG/DL
BLASTS ABSOLUTE COUNT: 0 %
BUN BLDV-MCNC: 32 MG/DL
CALCIUM SERPL-MCNC: 8.4 MG/DL
CBC AUTO DIF: ABNORMAL
CHLORIDE BLD-SCNC: 111 MMOL/L
CO2: 25.8 MMOL/L
COMPREHENSIVE METABOLIC PANEL: ABNORMAL
CREAT SERPL-MCNC: 1.61 MG/DL
DIFFERENTIAL, MANUAL: ABNORMAL
EOSINOPHILS ABSOLUTE: 0 %
GFR AFRICAN AMERICAN: 50 ML/MIN
GFR NON-AFRICAN AMERICAN: 41 ML/MIN
GLUCOSE BLD-MCNC: 128 MG/DL
HCT VFR BLD CALC: 34.6 %
HEMOGLOBIN: 11.4 G/DL
LEUKOCYTES, BLD: 10.6 K/UL
LYMPHOCYTES ABSOLUTE: 19 %
LYMPHS ABSOLUTE COUNT LARGE: 2 %
MCHC RBC AUTO-ENTMCNC: 30.8 PG
MCHC RBC AUTO-ENTMCNC: 33.1 G/DL
MCV RBC AUTO: 93 FL
METAMYELOCYTES: 0 %
MONOCYTES ABSOLUTE: 4 %
MYELOCYTES ABSOLUTE COUNT: 0 %
NEUTROPHILS SEGMENTED: 73 %
NUCLEATED RED BLOOD CELLS: 0 %
PDW BLD-RTO: 16.2 %
PLATELET # BLD: 408 K/UL
PMV BLD AUTO: 8 FL
POTASSIUM SERPL-SCNC: 4 MMOL/L
RBC # BLD: 3.72 M/UL
SODIUM BLD-SCNC: 147 MMOL/L
TOTAL PROTEIN: 6.3 G/DL

## 2018-06-26 RX ORDER — ALPRAZOLAM 0.5 MG/1
0.5 TABLET ORAL 2 TIMES DAILY PRN
Qty: 60 TABLET | Refills: 0 | Status: SHIPPED | OUTPATIENT
Start: 2018-06-26 | End: 2018-07-18 | Stop reason: SDUPTHER

## 2018-07-03 LAB
A/G RATIO: 0.8 (ref 1–2.5)
ALBUMIN SERPL-MCNC: 2.9 G/DL (ref 3.6–5)
ALP BLD-CCNC: 39 U/L (ref 46–116)
ALT SERPL-CCNC: 15 U/L (ref 12–78)
ANION GAP SERPL CALCULATED.3IONS-SCNC: 10.5 MMOL/L (ref 8–16)
AST SERPL-CCNC: 19 U/L (ref 12–36)
BASOPHILS RELATIVE PERCENT: 0.5 % (ref 0–1)
BILIRUB SERPL-MCNC: 0.3 MG/DL (ref 0–1.1)
BUN BLDV-MCNC: 21 MG/DL (ref 5–19)
CALCIUM SERPL-MCNC: 8.2 MG/DL (ref 8.4–10.2)
CHLORIDE BLD-SCNC: 109 MMOL/L (ref 99–111)
CO2: 27 MMOL/L (ref 21–33)
CREAT SERPL-MCNC: 1.7 MG/DL (ref 0.6–1.3)
EOSINOPHILS RELATIVE PERCENT: 2.2 % (ref 0–5)
ERYTHROCYTE DISTRIBUTION WIDTH RBC RATIO: 17.2 % (ref 11.6–14.8)
GFR CALCULATED: 39
GLOBULIN: 3.6 G/DL (ref 2–3.5)
GLUCOSE BLD-MCNC: 110 MG/DL (ref 74–99)
GRANULOCYTE ABSOLUTE COUNT: 6.3 X(10)3/UL (ref 1.8–7.2)
HCT VFR BLD CALC: 33.2 % (ref 37.4–53.8)
HEMOGLOBIN: 10.9 G/DL (ref 13.2–16.5)
IMMATURE GRANULOCYTES %: 0.8 % (ref 0–0.5)
LYMPHOCYTES ABSOLUTE: 1.7 X(10)3/UL (ref 1.1–2.7)
LYMPHOCYTES RELATIVE PERCENT: 18.5 % (ref 15–45)
MCH RBC QN AUTO: 31.4 PG (ref 27.7–33.3)
MCHC RBC AUTO-ENTMCNC: 32.8 G/DL (ref 32.8–35)
MCV RBC AUTO: 95.7 FL (ref 80.5–96.9)
MONOCYTES RELATIVE PERCENT: 9.5 % (ref 0–12)
NEUTROPHILS/100 LEUKOCYTES: 68.5 % (ref 40–70)
PLATELETS: 323 X1000 (ref 129–332)
POTASSIUM SERPL-SCNC: 4.4 MMOL/L (ref 3.4–5)
RBC # BLD: 3.47 X1000000 (ref 4.16–5.62)
SODIUM BLD-SCNC: 142 MMOL/L (ref 136–146)
TOTAL PROTEIN: 6.5 G/DL (ref 6.3–8)
WBC # BLD: 9.3 X1000 (ref 5.4–9.9)

## 2018-07-06 LAB
C DIFFICILE TOXIN, EIA: NEGATIVE
SEND TO IFC: NO

## 2018-07-09 ENCOUNTER — TELEPHONE (OUTPATIENT)
Dept: FAMILY MEDICINE CLINIC | Age: 83
End: 2018-07-09

## 2018-07-09 NOTE — TELEPHONE ENCOUNTER
Pt is inpatient at [facility]. Wife concerned because Christopher Loza had diarrhea just horribly Friday and Saturday, she said that every single time he stood up, the diarrhea would just run down his leg. He kept messing the bed. Reports that every single time [facility] run his cultures, they're negative. Yesterday she called in the am to check on him. No diarrhea thru the night and she said he did make it to the dining room to eat. Was told that [facility] and [facility] do not use the same procedure according to Dr Raj Saleem when running the stool cultures. Reports that when he's finished with the Vanc, he'll be OK for 2-3 days with soft stool and then the diarrhea starts back up again. She doesn't want [facility] to do anymore stool cultures.   What do you suggest?

## 2018-07-10 LAB
CULTURE, STOOL: NORMAL
SEND TO IFC: NO

## 2018-07-10 RX ORDER — VANCOMYCIN HYDROCHLORIDE 125 MG/1
CAPSULE ORAL
Qty: 76 CAPSULE | Refills: 0 | Status: SHIPPED | OUTPATIENT
Start: 2018-07-10 | End: 2018-08-01 | Stop reason: ALTCHOICE

## 2018-07-16 ENCOUNTER — TELEPHONE (OUTPATIENT)
Dept: FAMILY MEDICINE CLINIC | Age: 83
End: 2018-07-16

## 2018-07-16 DIAGNOSIS — A04.72 C. DIFFICILE DIARRHEA: Primary | ICD-10-CM

## 2018-07-18 DIAGNOSIS — F41.9 ANXIETY: ICD-10-CM

## 2018-07-18 RX ORDER — ALPRAZOLAM 0.5 MG/1
0.5 TABLET ORAL NIGHTLY
Qty: 30 TABLET | Refills: 0
Start: 2018-07-18 | End: 2018-08-17

## 2018-07-20 ENCOUNTER — INITIAL CONSULT (OUTPATIENT)
Dept: GASTROENTEROLOGY | Age: 83
End: 2018-07-20

## 2018-07-20 VITALS
WEIGHT: 146.6 LBS | HEIGHT: 67 IN | BODY MASS INDEX: 23.01 KG/M2 | SYSTOLIC BLOOD PRESSURE: 100 MMHG | DIASTOLIC BLOOD PRESSURE: 80 MMHG

## 2018-07-20 DIAGNOSIS — R19.7 DIARRHEA, UNSPECIFIED TYPE: Primary | ICD-10-CM

## 2018-07-20 DIAGNOSIS — A04.72 COLITIS DUE TO CLOSTRIDIUM DIFFICILE: ICD-10-CM

## 2018-07-20 PROCEDURE — 99203 OFFICE O/P NEW LOW 30 MIN: CPT | Performed by: INTERNAL MEDICINE

## 2018-07-31 ENCOUNTER — TELEPHONE (OUTPATIENT)
Dept: FAMILY MEDICINE CLINIC | Age: 83
End: 2018-07-31

## 2018-08-01 ENCOUNTER — OFFICE VISIT (OUTPATIENT)
Dept: FAMILY MEDICINE CLINIC | Age: 83
End: 2018-08-01

## 2018-08-01 VITALS
HEIGHT: 67 IN | WEIGHT: 149 LBS | HEART RATE: 88 BPM | BODY MASS INDEX: 23.39 KG/M2 | OXYGEN SATURATION: 91 % | DIASTOLIC BLOOD PRESSURE: 62 MMHG | SYSTOLIC BLOOD PRESSURE: 98 MMHG

## 2018-08-01 DIAGNOSIS — I82.813 CHRONIC SUPERFICIAL VENOUS THROMBOSIS OF BOTH LOWER EXTREMITIES: Primary | ICD-10-CM

## 2018-08-01 DIAGNOSIS — N18.4 CHRONIC KIDNEY DISEASE (CKD), STAGE IV (SEVERE) (HCC): ICD-10-CM

## 2018-08-01 DIAGNOSIS — I42.9 CARDIOMYOPATHY, UNSPECIFIED TYPE (HCC): ICD-10-CM

## 2018-08-01 DIAGNOSIS — I82.503 CHRONIC DEEP VEIN THROMBOSIS (DVT) OF BOTH LOWER EXTREMITIES, UNSPECIFIED VEIN (HCC): ICD-10-CM

## 2018-08-01 DIAGNOSIS — I48.0 PAROXYSMAL ATRIAL FIBRILLATION (HCC): ICD-10-CM

## 2018-08-01 DIAGNOSIS — A04.72 COLITIS DUE TO CLOSTRIDIUM DIFFICILE: ICD-10-CM

## 2018-08-01 DIAGNOSIS — I50.20 SYSTOLIC CONGESTIVE HEART FAILURE, UNSPECIFIED CONGESTIVE HEART FAILURE CHRONICITY: ICD-10-CM

## 2018-08-01 PROCEDURE — 36415 COLL VENOUS BLD VENIPUNCTURE: CPT | Performed by: FAMILY MEDICINE

## 2018-08-01 PROCEDURE — 99214 OFFICE O/P EST MOD 30 MIN: CPT | Performed by: FAMILY MEDICINE

## 2018-08-01 ASSESSMENT — ENCOUNTER SYMPTOMS
DIARRHEA: 0
SHORTNESS OF BREATH: 0

## 2018-08-01 NOTE — PROGRESS NOTES
activity: Yes     Partners: Female     Other Topics Concern    Not on file     Social History Narrative    No narrative on file       Prior to Visit Medications    Medication Sig Taking? Authorizing Provider   apixaban (ELIQUIS) 5 MG TABS tablet Take 5 mg by mouth 2 times daily Yes Historical Provider, MD   Fidaxomicin (DIFICID) 200 MG TABS tablet Take 200 mg by mouth 2 times daily Yes Yosi Loco MD   furosemide (LASIX) 20 MG tablet TAKE 1 TABLET BY MOUTH DAILY Yes Binta Smith MD   ALPRAZolam [employer]) 0.5 MG tablet Take 1 tablet by mouth nightly for 30 days. Farhad Smart MD   amiodarone (CORDARONE) 200 MG tablet TAKE 1/2 TABLET BY MOUTH EVERY DAY FOR HYPERTENSION Yes Binta Smith MD   mirtazapine (REMERON) 30 MG tablet Take 1 tablet by mouth nightly Yes Binta Smith MD   donepezil (ARICEPT) 5 MG tablet Take 1 tablet by mouth nightly Yes Binta Smith MD   aspirin 81 MG tablet Take 81 mg by mouth daily Yes Historical Provider, MD   acetaminophen (TYLENOL) 325 MG tablet Take 650 mg by mouth every 4 hours as needed for Pain Yes Historical Provider, MD   Multiple Vitamins-Minerals (GNP MENS MULTIPLUS PO) Take by mouth Yes Historical Provider, MD   isosorbide dinitrate (ISORDIL) 10 MG tablet Take 1 tablet by mouth 3 times daily Yes Binta Smith MD   potassium chloride (KLOR-CON) 10 MEQ extended release tablet Take 1 tablet by mouth 3 times daily  Patient taking differently: Take 10 mEq by mouth 2 times daily  Yes Binta Smith MD   metoprolol tartrate (LOPRESSOR) 25 MG tablet Take 1 tablet by mouth 2 times daily Yes Binta Smith MD   ammonium lactate (LAC-HYDRIN) 12 % lotion Apply topically daily.  Yes Binta Smith MD   omeprazole (PRILOSEC) 20 MG delayed release capsule TAKE 1 CAPSULE BY MOUTH DAILY Yes Binta Smith MD   vitamin B-12 (CYANOCOBALAMIN) 500 MCG tablet Take 500 mcg by mouth daily Yes Historical Provider, MD   bimatoprost (LUMIGAN) 0.01 % SOLN ophthalmic drops Place 1 drop into the left eye nightly Yes Historical Provider, MD       Allergies   Allergen Reactions    Warfarin Itching       OBJECTIVE:    BP 98/62   Pulse 88   Ht 5' 7\" (1.702 m)   Wt 149 lb (67.6 kg)   SpO2 91%   BMI 23.34 kg/m²     BP Readings from Last 2 Encounters:   08/01/18 98/62   07/20/18 100/80       Wt Readings from Last 3 Encounters:   08/01/18 149 lb (67.6 kg)   07/20/18 146 lb 9.6 oz (66.5 kg)   06/21/18 157 lb 6.4 oz (71.4 kg)       Physical Exam   Constitutional: He is oriented to person, place, and time. He appears well-developed and well-nourished. HENT:   Head: Normocephalic and atraumatic. Eyes: Conjunctivae and EOM are normal.   Neck: No tracheal deviation present. Cardiovascular: Normal rate and regular rhythm. Frequent extrasystoles are present. Exam reveals no gallop and no friction rub. Murmur heard. Systolic murmur is present with a grade of 2/6   Pulmonary/Chest: Effort normal and breath sounds normal.   Abdominal: Soft. There is no tenderness. Musculoskeletal: He exhibits edema. Neurological: He is alert and oriented to person, place, and time. Skin: Skin is warm and dry. Psychiatric: He has a normal mood and affect. ASSESSMENT/PLAN:    1. 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 Cont eliquis    2. Chronic deep vein thrombosis (DVT) of both lower extremities, unspecified vein (HCC)  See above    3. Chronic kidney disease (CKD), stage IV (severe) ([address]  Rpt labs. Was stable last check    4. Systolic congestive heart failure, unspecified congestive heart failure chronicity (HCC)  rec cardio f/u .      5. Paroxysmal atrial fibrillation (HCC)  F/u w/ cardio    6. Cardiomyopathy, unspecified type ([address]  F/u w/ cardio recommended. 7. Colitis due to Clostridium difficile  Improved w/ dificid. F/u w/ GI later this mo as sched    7. Dementia  Recommend NOT driving at this time.   Consider formal driving

## 2018-08-01 NOTE — LETTER
[address]  Phone: [phone]  Fax: [phone]    Janeice Goodell, MD        August 1, 2018     Patient: Maurizio Mckinney   YOB: 1934   Date of Visit: 8/1/2018       To Whom It May Concern: It is my medical opinion that Giselle Solis requires a disability parking placard due to his current medical conditions. Duration of need: 5 years    If you have any questions or concerns, please don't hesitate to call.     Sincerely,        Janeice Goodell, MD

## 2018-08-02 LAB
A/G RATIO: 1.4 (ref 1.1–2.2)
ALBUMIN SERPL-MCNC: 3.6 G/DL (ref 3.4–5)
ALP BLD-CCNC: 44 U/L (ref 40–129)
ALT SERPL-CCNC: 9 U/L (ref 10–40)
ANION GAP SERPL CALCULATED.3IONS-SCNC: 13 MMOL/L (ref 3–16)
AST SERPL-CCNC: 19 U/L (ref 15–37)
BASOPHILS ABSOLUTE: 0.1 K/UL (ref 0–0.2)
BASOPHILS RELATIVE PERCENT: 0.8 %
BILIRUB SERPL-MCNC: 0.4 MG/DL (ref 0–1)
BUN BLDV-MCNC: 26 MG/DL (ref 7–20)
CALCIUM SERPL-MCNC: 8.8 MG/DL (ref 8.3–10.6)
CHLORIDE BLD-SCNC: 108 MMOL/L (ref 99–110)
CHOLESTEROL, TOTAL: 185 MG/DL (ref 0–199)
CO2: 24 MMOL/L (ref 21–32)
CREAT SERPL-MCNC: 2 MG/DL (ref 0.8–1.3)
EOSINOPHILS ABSOLUTE: 0.2 K/UL (ref 0–0.6)
EOSINOPHILS RELATIVE PERCENT: 3.1 %
GFR AFRICAN AMERICAN: 39
GFR NON-AFRICAN AMERICAN: 32
GLOBULIN: 2.6 G/DL
GLUCOSE BLD-MCNC: 113 MG/DL (ref 70–99)
HCT VFR BLD CALC: 35.2 % (ref 40.5–52.5)
HDLC SERPL-MCNC: 71 MG/DL (ref 40–60)
HEMOGLOBIN: 11.7 G/DL (ref 13.5–17.5)
LDL CHOLESTEROL CALCULATED: 84 MG/DL
LYMPHOCYTES ABSOLUTE: 1.1 K/UL (ref 1–5.1)
LYMPHOCYTES RELATIVE PERCENT: 14.8 %
MCH RBC QN AUTO: 32.3 PG (ref 26–34)
MCHC RBC AUTO-ENTMCNC: 33.2 G/DL (ref 31–36)
MCV RBC AUTO: 97.4 FL (ref 80–100)
MONOCYTES ABSOLUTE: 0.9 K/UL (ref 0–1.3)
MONOCYTES RELATIVE PERCENT: 11.9 %
NEUTROPHILS ABSOLUTE: 5.2 K/UL (ref 1.7–7.7)
NEUTROPHILS RELATIVE PERCENT: 69.4 %
PDW BLD-RTO: 17.6 % (ref 12.4–15.4)
PLATELET # BLD: 280 K/UL (ref 135–450)
PMV BLD AUTO: 8.6 FL (ref 5–10.5)
POTASSIUM SERPL-SCNC: 4.5 MMOL/L (ref 3.5–5.1)
RBC # BLD: 3.61 M/UL (ref 4.2–5.9)
SODIUM BLD-SCNC: 145 MMOL/L (ref 136–145)
T4 FREE: 1.2 NG/DL (ref 0.9–1.8)
TOTAL PROTEIN: 6.2 G/DL (ref 6.4–8.2)
TRIGL SERPL-MCNC: 148 MG/DL (ref 0–150)
TSH SERPL DL<=0.05 MIU/L-ACNC: 2.38 UIU/ML (ref 0.27–4.2)
VLDLC SERPL CALC-MCNC: 30 MG/DL
WBC # BLD: 7.5 K/UL (ref 4–11)

## 2018-08-10 ENCOUNTER — TELEPHONE (OUTPATIENT)
Dept: GASTROENTEROLOGY | Age: 83
End: 2018-08-10

## 2018-08-10 NOTE — TELEPHONE ENCOUNTER
Patient called in stated he has finish his medication on Saturday 8/4 10 days dose. Pt is feeling very weak and needs to know if he wants to start it back up or something else.

## 2018-08-11 NOTE — TELEPHONE ENCOUNTER
AN note says repeat c diff if still having significant diarrhea which I would say should be happening day and night unrelated to eating with more then 3/d. If he does have recurrent c diff, pretty sure we can to fecal transplants at [facility]. [facility] [place] done them so we should be able to also. Would need to get with Alicia Saini on this one. Stool comes from microbiome.

## 2018-08-13 ENCOUNTER — TELEPHONE (OUTPATIENT)
Dept: FAMILY MEDICINE CLINIC | Age: 83
End: 2018-08-13

## 2018-08-13 DIAGNOSIS — R19.7 DIARRHEA, UNSPECIFIED TYPE: Primary | ICD-10-CM

## 2018-08-13 NOTE — TELEPHONE ENCOUNTER
Pt wife called very upset that she did not get a response on Friday for her , she feels that she should have gotten an answer and medications sent to pharmacy for his C-diff. Pt wife states that she is going to call the PCP and will be going elsewhere for any further medical care.

## 2018-08-13 NOTE — TELEPHONE ENCOUNTER
Could try another round, but doubt will be successful long term.   Will need back to GI for other options

## 2018-08-15 NOTE — TELEPHONE ENCOUNTER
Spoke with pt wife and she states she does not want to pursue care at this time and does not want to talk about fecal transplant, they will be finding another GI provider. The PCP prescribed another round of the Dificid for the pt without doing C-Diff testing, they are going to see if that works.

## 2018-08-21 ENCOUNTER — OFFICE VISIT (OUTPATIENT)
Dept: GASTROENTEROLOGY | Age: 83
End: 2018-08-21

## 2018-08-21 VITALS
SYSTOLIC BLOOD PRESSURE: 90 MMHG | WEIGHT: 147.4 LBS | BODY MASS INDEX: 23.13 KG/M2 | DIASTOLIC BLOOD PRESSURE: 62 MMHG | HEIGHT: 67 IN

## 2018-08-21 DIAGNOSIS — A04.72 COLITIS DUE TO CLOSTRIDIUM DIFFICILE: Primary | ICD-10-CM

## 2018-08-21 DIAGNOSIS — R19.7 DIARRHEA OF PRESUMED INFECTIOUS ORIGIN: ICD-10-CM

## 2018-08-21 PROCEDURE — 99213 OFFICE O/P EST LOW 20 MIN: CPT | Performed by: INTERNAL MEDICINE

## 2018-08-21 RX ORDER — ALPRAZOLAM 0.5 MG/1
0.5 TABLET ORAL 2 TIMES DAILY
COMMUNITY
End: 2018-09-07 | Stop reason: SDUPTHER

## 2018-08-21 RX ORDER — MIRTAZAPINE 30 MG/1
30 TABLET, FILM COATED ORAL NIGHTLY
Refills: 3 | COMMUNITY
Start: 2018-08-08 | End: 2018-10-30 | Stop reason: SDUPTHER

## 2018-08-21 NOTE — LETTER
COLONOSCOPY PREP INSTRUCTIONS  MlRALAX SPLIT DOSE   [facility] PHYSICIAN ENDOSCOPY    Your colonoscopy is scheduled on: [email or web address]   __Ash_  Arrival Time: __10:00AM_   DO NOT EAT OR DRINK (INCLUDING WATER) AFTER: Midnight _  's Name_[facility] [phone]  [facility]- [phone]    Keep these papers together; REVIEW ALL OF THEM AT LEAST 7 DAYS BEFORE THE PROCEDURE. Please complete all paperwork; including a current list of your medications, to avoid delays in the admission process. The following instructions must be followed in order to ensure your procedure has optimal outcomes. - KEEP YOUR APPOINTMENT. If for any reason, you are unable to keep your appointment, please notify us within 72 hours before your procedure. - You MUST have a responsible adult to drive you, who MUST remain at our facility the ENTIRE time. If not the procedure will be cancelled. You may go by taxi ONLY if you have a responsible adult with you. You may experience light headedness, dizziness etc., therefore you should have a responsible adult remain with you until the morning after your procedure. - Bring your insurance card and 's license. Call your insurance carrier to verify your benefits, and confirm that our facility is in your network, prior to the procedure date to ensure coverage. The facility name is listed as [facility] or [facility]  and the tax ID# is [id number].  - Due to the safety and privacy of our patients, only one visitor is allowed in the recovery area after the procedure. The center will not be responsible for lost valuables so please leave them at home. - Try to avoid seeds (strawberries, gonzález, and rye) for one week prior to your procedure.   - If you have questions after beginning the prep, call between 8:30 am & 4:30pm you will speak to a nurse or medical assistant, after 4:30pm you will reach the physician on call. - Hydration (body fluid) is the most important aspect of an effective and safe prep. If you are not drinking enough fluid you may experience cramping, nausea and dizziness. - Common side effects of the prep are nausea, bloating and shaking chills. If any of these occur, take a break from the prep (30 minutes to 1 hour) and then restart. If unable to complete after that notify the Physician as your procedure may need to be cancelled. Nausea medication or an alternative prep is sometimes called in.  - Do not leave home after starting the prep. Frequent, liquid stools may begin as soon as 15 minutes after the first bottle, although it could take up to 4 hours or longer for your first bowel movement. - Even if your stools are clear and watery in appearance, TAKE ALL OF THE PREP.  - Using baby wipes and nonprescription ointments, such as Desitin, will help with the irritation caused by frequent loose stools. - Due to unforeseen schedule changes, you may be asked to move your appointment time to an earlier/later time slot. - If you are scheduled at the hospital with anesthesia you will need to discontinue all liquids even water 4 hours prior to your procedure. To insure that your prep gives you the best results for your Colonoscopy, Please follow all directions closely. 3-5 days prior to your procedure:  1. Begin a low-fiber diet (no corn, dried beans, tomatoes, nuts, seeds, lettuce). 2. No bran or bulking agents. 3. Stop taking your multivitamin or iron supplements.   4. If you currently take Aggrenox, Dipyridamole, Persantine, Fondaparinux sodium (Arixtra), Dalteparin sodium Christo Carl), Warfarin (Coumadin), Clopidogrel (Plavix), Prasugrel (Effient), Enoxaparin, Lovenox, or Heparin, Cilostazol (Pletal), Rivoroxaban (Xarelto), Desirudin (Iprivask),

## 2018-08-24 ENCOUNTER — TELEPHONE (OUTPATIENT)
Dept: GASTROENTEROLOGY | Age: 83
End: 2018-08-24

## 2018-08-27 DIAGNOSIS — F41.9 ANXIETY: ICD-10-CM

## 2018-08-30 RX ORDER — ALPRAZOLAM 0.5 MG/1
TABLET ORAL
Qty: 60 TABLET | Refills: 0 | OUTPATIENT
Start: 2018-08-30 | End: 2018-09-29

## 2018-09-04 ENCOUNTER — TELEPHONE (OUTPATIENT)
Dept: GASTROENTEROLOGY | Age: 83
End: 2018-09-04

## 2018-09-05 ENCOUNTER — TELEPHONE (OUTPATIENT)
Dept: GASTROENTEROLOGY | Age: 83
End: 2018-09-05

## 2018-09-07 ENCOUNTER — TELEPHONE (OUTPATIENT)
Dept: GASTROENTEROLOGY | Age: 83
End: 2018-09-07

## 2018-09-07 DIAGNOSIS — F41.9 ANXIETY: Primary | ICD-10-CM

## 2018-09-07 NOTE — TELEPHONE ENCOUNTER
Spoke with patient's wife, informed her to discontinue VSL-3 and take fiber daily. Informed her if patient continues to have diarrhea over the weekend to call back on Monday and we would schedule a diagnostic colonoscopy. She was in agreement and understood.

## 2018-09-10 ENCOUNTER — TELEPHONE (OUTPATIENT)
Dept: GASTROENTEROLOGY | Age: 83
End: 2018-09-10

## 2018-09-10 DIAGNOSIS — F41.9 ANXIETY: ICD-10-CM

## 2018-09-10 RX ORDER — ALPRAZOLAM 0.5 MG/1
0.5 TABLET ORAL 2 TIMES DAILY PRN
Qty: 60 TABLET | Refills: 0 | Status: SHIPPED | OUTPATIENT
Start: 2018-09-10 | End: 2018-09-10 | Stop reason: SDUPTHER

## 2018-09-10 RX ORDER — ALPRAZOLAM 0.5 MG/1
0.5 TABLET ORAL 2 TIMES DAILY PRN
Qty: 60 TABLET | Refills: 0 | Status: ON HOLD | OUTPATIENT
Start: 2018-09-10 | End: 2018-09-24

## 2018-09-10 NOTE — TELEPHONE ENCOUNTER
Date of last refill of this med was not refilled by us. Their next appointment is 10/01/18, the last date patient was seen was 8/1/18. Does patient have medication agreement on file? No  Has drug screen been done in last 12 months if needed? no  Has [place] report been ran in last 90 days?  8/28/18

## 2018-09-11 ENCOUNTER — TELEPHONE (OUTPATIENT)
Dept: FAMILY MEDICINE CLINIC | Age: 83
End: 2018-09-11

## 2018-09-11 NOTE — TELEPHONE ENCOUNTER
Pt has had 5 bouts of diarrhea this morning. She's cleaned the bathroom twice. Seeing Dr Heath Edgar (GI)  and nothing seems to be helping. He's very weak. Barely able to walk. She wanted you to just admit him. I let her know that we can't do an admit like that. Advised to take him to the ED.

## 2018-09-12 DIAGNOSIS — A04.72 COLITIS DUE TO CLOSTRIDIUM DIFFICILE: Primary | ICD-10-CM

## 2018-09-12 RX ORDER — POLYETHYLENE GLYCOL 3350 17 G/17G
255 POWDER, FOR SOLUTION ORAL ONCE
Qty: 255 G | Refills: 0 | Status: SHIPPED | OUTPATIENT
Start: 2018-09-12 | End: 2018-09-12

## 2018-09-12 NOTE — LETTER
go by taxi ONLY if you have a responsible adult with you. You may experience light headedness, dizziness etc., therefore you should have a responsible adult remain with you until the morning after your procedure. - Bring your insurance card and 's license. Call your insurance carrier to verify your benefits, and confirm that our facility is in your network, prior to the procedure date to ensure coverage. The facility name is listed as [facility]  and the tax ID# is [id number].  - Due to the safety and privacy of our patients, only one visitor is allowed in the recovery area after the procedure. The center will not be responsible for lost valuables so please leave them at home. - Try to avoid seeds (strawberries, gonzález, and rye) for one week prior to your procedure. - If you have questions after beginning the prep, call between 8:30 am & 4:30pm you will speak to a nurse or medical assistant, after 4:30pm you will reach the physician on call. - Hydration (body fluid) is the most important aspect of an effective and safe prep. If you are not drinking enough fluid you may experience cramping, nausea and dizziness. - Common side effects of the prep are nausea, bloating and shaking chills. If any of these occur, take a break from the prep (30 minutes to 1 hour) and then restart. If unable to complete after that notify the Physician as your procedure may need to be cancelled. Nausea medication or an alternative prep is sometimes called in.  - Do not leave home after starting the prep. Frequent, liquid stools may begin as soon as 15 minutes after the first bottle, although it could take up to 4 hours or longer for your first bowel movement.   - Even if your stools are clear and watery in appearance, TAKE ALL OF THE PREP.  - Using baby wipes and nonprescription ointments, such as Desitin, will help with the irritation caused by frequent loose stools. - Due to unforeseen schedule changes, you may be asked to move your appointment time to an earlier/later time slot. To insure that your prep gives you the best results for your Colonoscopy, Please follow all directions closely. 3-5 days prior to your procedure:  1. Begin a low-fiber diet (no corn, dried beans, tomatoes, nuts, seeds, lettuce). 2. No bran or bulking agents. 3. Stop taking your multivitamin or iron supplements. 4. If you currently take Aggrenox, Dipyridamole, Persantine, Fondaparinux sodium (Arixtra), Dalteparin sodium [place]), Warfarin (Coumadin), Clopidogrel (Plavix), Prasugrel (Effient), Enoxaparin, Lovenox, or Heparin, Cilostazol (Pletal), Rivoroxaban (Xarelto), Desirudin (Iprivask), Cyclopentyltrazolopyrimide (Ticagrelor or Brilinta), Cangrelor (Mollie Holter), Dabigatran (Pradaxa), Apixaban (Eliquis), Edoxaban (Savaysa)you should have received instructions regarding if and when to discontinue the medication. If you have not, or do not clearly understand the instructions, please call the office for clarification (number listed above). 5. Drink plenty of fluid. 1 day prior to your procedure:  1. Do not eat any SOLID FOOD, beginning with breakfast drink clear liquids only, which includes: Chicken or Beef Broth, Coffee/tea (without milk or creamer) Gatorade/PowerAde (no red or purple), JeIl-O (no red or purple), All Soda (even dark cola), Sorbet/Popsicles (no red or purple), Water If you take Diabetic medications (insulin/oral medication)-reduce the amount by one half on the morning of prep. You may resume the meds once you begin eating again. You must drink 8oz of liquids every hour to avoid dehydration. 2. If you take Diabetic medications (insulin/oral medication) - reduce the amount by one half on morning of prep. You may resume the meds once you begin eating again.   3. Bowel Cleansing Prep 3:00pm Take 4 dulcolax tablets with a full glass of water   5:00pm Mix one bottle of Miralax (238gm) in one bottle of Gatorade (64oz). Shake until dissolved. Drink 8 oz every 15 minutes until you have finished half of the solution. MORNING OF PROCEDURE  1. __4:30AM__ (5 hours prior to the procedure) Drink the remaining portion of the solution 8 oz. every 15 minutes until completely finished, followed by 8 oz of any of the approved liquids. 2. Take your Blood pressure, Heart and Seizure medication the morning of the procedure with sips of water. 3. Bring inhalers with you. 4. Do not take your Diabetic medication the morning of procedure. [facility]  [facility] [place], [address] [facility]      Once you turn into the hospital, turn right (towards E.R.) go past the E.R., youll see a driveway on your left. Make that turn, the sign will say [facility] or Surgery entrance. [address] [facility]  Go through 2 sets of double doors, sign in at window, you will get registered there and they will send you up the hallway to the Endoscopy area. Dear Patient,      Kaleigh Dumont will receive a call from the [facility] pre-registration department prior to your GI procedure. This will help streamline your check-in process on the day of service. During this call a skilled associate will review your demographic and insurance benefits information. The associate will answer any question pertaining to your insurance contract, such as deductible/co-insurance/ co-pay or any other financial concerns. They may offer an amount that you could pay on the day of surgery but this is not a requirement.       Thank you for allowing [facility] to be part of your Health Care Team.

## 2018-09-12 NOTE — TELEPHONE ENCOUNTER
----- Message from Qi Cook MD sent at 9/12/2018 11:49 AM EDT -----  [id number] Jaleesa Hathaway to please go ahead and schedule a colonoscopy (with MAC). PCP is ok with holding Eliquis for 2 days prior. ----- Message -----  From: Savanah Mena MA  Sent: 9/12/2018  10:37 AM  To: MD Dr Luis Carlos Rosario, will you please look at this response by Dr Chinmay Roy? Is it ok to go ahead and schedule?  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 It would likely be okay to stop the eliquis  for 2 days prior to procedure and resume the evening after the procedure is done.  ----- Message -----  From: Savanah Mena MA  Sent: 9/10/2018   3:42 PM  To: Jeffy Cardenas MD    Pt needs to have a Colonoscopy as soon as possible, is it ok for the pt to d/c Eliquis for 2 days prior to the procedure.

## 2018-09-14 DIAGNOSIS — I42.9 CARDIOMYOPATHY, UNSPECIFIED TYPE (HCC): ICD-10-CM

## 2018-09-18 ENCOUNTER — TELEPHONE (OUTPATIENT)
Dept: GASTROENTEROLOGY | Age: 83
End: 2018-09-18

## 2018-09-18 ENCOUNTER — APPOINTMENT (OUTPATIENT)
Dept: GENERAL RADIOLOGY | Age: 83
DRG: 372 | End: 2018-09-18
Payer: COMMERCIAL

## 2018-09-18 ENCOUNTER — HOSPITAL ENCOUNTER (INPATIENT)
Age: 83
LOS: 8 days | Discharge: NURSING, REHAB OR LONG-TERM CARE | DRG: 372 | End: 2018-09-26
Attending: EMERGENCY MEDICINE | Admitting: INTERNAL MEDICINE
Payer: COMMERCIAL

## 2018-09-18 DIAGNOSIS — F41.9 ANXIETY: ICD-10-CM

## 2018-09-18 DIAGNOSIS — R62.7 FTT (FAILURE TO THRIVE) IN ADULT: ICD-10-CM

## 2018-09-18 DIAGNOSIS — R19.7 DIARRHEA OF PRESUMED INFECTIOUS ORIGIN: ICD-10-CM

## 2018-09-18 DIAGNOSIS — N17.9 ACUTE RENAL FAILURE, UNSPECIFIED ACUTE RENAL FAILURE TYPE (HCC): Primary | ICD-10-CM

## 2018-09-18 DIAGNOSIS — Z86.19 HX OF CLOSTRIDIUM DIFFICILE INFECTION: ICD-10-CM

## 2018-09-18 LAB
A/G RATIO: 1.1 (ref 1.1–2.2)
ALBUMIN SERPL-MCNC: 2.8 G/DL (ref 3.4–5)
ALP BLD-CCNC: 61 U/L (ref 40–129)
ALT SERPL-CCNC: 6 U/L (ref 10–40)
ANION GAP SERPL CALCULATED.3IONS-SCNC: 15 MMOL/L (ref 3–16)
ANISOCYTOSIS: ABNORMAL
AST SERPL-CCNC: 11 U/L (ref 15–37)
ATYPICAL LYMPHOCYTE RELATIVE PERCENT: 1 % (ref 0–6)
BANDED NEUTROPHILS RELATIVE PERCENT: 7 % (ref 0–7)
BASOPHILS ABSOLUTE: 0 K/UL (ref 0–0.2)
BASOPHILS RELATIVE PERCENT: 0 %
BILIRUB SERPL-MCNC: 0.8 MG/DL (ref 0–1)
BILIRUBIN URINE: NEGATIVE
BLOOD, URINE: NEGATIVE
BUN BLDV-MCNC: 50 MG/DL (ref 7–20)
C DIFFICILE TOXIN, EIA: ABNORMAL
CALCIUM SERPL-MCNC: 8.3 MG/DL (ref 8.3–10.6)
CHLORIDE BLD-SCNC: 108 MMOL/L (ref 99–110)
CLARITY: CLEAR
CO2: 22 MMOL/L (ref 21–32)
COLOR: YELLOW
CREAT SERPL-MCNC: 2.9 MG/DL (ref 0.8–1.3)
EOSINOPHILS ABSOLUTE: 0.3 K/UL (ref 0–0.6)
EOSINOPHILS RELATIVE PERCENT: 1 %
GFR AFRICAN AMERICAN: 25
GFR NON-AFRICAN AMERICAN: 21
GLOBULIN: 2.6 G/DL
GLUCOSE BLD-MCNC: 107 MG/DL (ref 70–99)
GLUCOSE URINE: NEGATIVE MG/DL
HCT VFR BLD CALC: 38.7 % (ref 40.5–52.5)
HEMOGLOBIN: 12.8 G/DL (ref 13.5–17.5)
INR BLD: 1.51 (ref 0.86–1.14)
KETONES, URINE: NEGATIVE MG/DL
LEUKOCYTE ESTERASE, URINE: NEGATIVE
LYMPHOCYTES ABSOLUTE: 2.1 K/UL (ref 1–5.1)
LYMPHOCYTES RELATIVE PERCENT: 7 %
MCH RBC QN AUTO: 31.7 PG (ref 26–34)
MCHC RBC AUTO-ENTMCNC: 33 G/DL (ref 31–36)
MCV RBC AUTO: 96 FL (ref 80–100)
MICROSCOPIC EXAMINATION: NORMAL
MONOCYTES ABSOLUTE: 0.8 K/UL (ref 0–1.3)
MONOCYTES RELATIVE PERCENT: 3 %
NEUTROPHILS ABSOLUTE: 23.3 K/UL (ref 1.7–7.7)
NEUTROPHILS RELATIVE PERCENT: 81 %
NITRITE, URINE: NEGATIVE
OCCULT BLOOD DIAGNOSTIC: ABNORMAL
PDW BLD-RTO: 16.5 % (ref 12.4–15.4)
PH UA: 5.5
PLATELET # BLD: 314 K/UL (ref 135–450)
PLATELET SLIDE REVIEW: ADEQUATE
PMV BLD AUTO: 8 FL (ref 5–10.5)
POTASSIUM SERPL-SCNC: 3.3 MMOL/L (ref 3.5–5.1)
PROTEIN UA: NEGATIVE MG/DL
PROTHROMBIN TIME: 17.2 SEC (ref 9.8–13)
RBC # BLD: 4.03 M/UL (ref 4.2–5.9)
SLIDE REVIEW: ABNORMAL
SODIUM BLD-SCNC: 145 MMOL/L (ref 136–145)
SPECIFIC GRAVITY UA: 1.02
TOTAL PROTEIN: 5.4 G/DL (ref 6.4–8.2)
TOXIC GRANULATION: PRESENT
URINE REFLEX TO CULTURE: NORMAL
URINE TYPE: NORMAL
UROBILINOGEN, URINE: 0.2 E.U./DL
VACUOLATED NEUTROPHILS: PRESENT
WBC # BLD: 26.5 K/UL (ref 4–11)

## 2018-09-18 PROCEDURE — 6370000000 HC RX 637 (ALT 250 FOR IP): Performed by: INTERNAL MEDICINE

## 2018-09-18 PROCEDURE — 2580000003 HC RX 258: Performed by: EMERGENCY MEDICINE

## 2018-09-18 PROCEDURE — 96361 HYDRATE IV INFUSION ADD-ON: CPT

## 2018-09-18 PROCEDURE — 85610 PROTHROMBIN TIME: CPT

## 2018-09-18 PROCEDURE — 87505 NFCT AGENT DETECTION GI: CPT

## 2018-09-18 PROCEDURE — 99285 EMERGENCY DEPT VISIT HI MDM: CPT

## 2018-09-18 PROCEDURE — 87324 CLOSTRIDIUM AG IA: CPT

## 2018-09-18 PROCEDURE — 93005 ELECTROCARDIOGRAM TRACING: CPT | Performed by: EMERGENCY MEDICINE

## 2018-09-18 PROCEDURE — 87046 STOOL CULTR AEROBIC BACT EA: CPT

## 2018-09-18 PROCEDURE — 1200000000 HC SEMI PRIVATE

## 2018-09-18 PROCEDURE — 80053 COMPREHEN METABOLIC PANEL: CPT

## 2018-09-18 PROCEDURE — G0328 FECAL BLOOD SCRN IMMUNOASSAY: HCPCS

## 2018-09-18 PROCEDURE — 71046 X-RAY EXAM CHEST 2 VIEWS: CPT

## 2018-09-18 PROCEDURE — 85025 COMPLETE CBC W/AUTO DIFF WBC: CPT

## 2018-09-18 PROCEDURE — 87449 NOS EACH ORGANISM AG IA: CPT

## 2018-09-18 PROCEDURE — 2580000003 HC RX 258: Performed by: INTERNAL MEDICINE

## 2018-09-18 PROCEDURE — 96360 HYDRATION IV INFUSION INIT: CPT

## 2018-09-18 PROCEDURE — 81003 URINALYSIS AUTO W/O SCOPE: CPT

## 2018-09-18 RX ORDER — SODIUM CHLORIDE 0.9 % (FLUSH) 0.9 %
10 SYRINGE (ML) INJECTION PRN
Status: DISCONTINUED | OUTPATIENT
Start: 2018-09-18 | End: 2018-09-26 | Stop reason: HOSPADM

## 2018-09-18 RX ORDER — AMIODARONE HYDROCHLORIDE 200 MG/1
200 TABLET ORAL DAILY
Status: DISCONTINUED | OUTPATIENT
Start: 2018-09-18 | End: 2018-09-26 | Stop reason: HOSPADM

## 2018-09-18 RX ORDER — POTASSIUM CHLORIDE 750 MG/1
10 TABLET, EXTENDED RELEASE ORAL 2 TIMES DAILY
Status: DISCONTINUED | OUTPATIENT
Start: 2018-09-18 | End: 2018-09-19

## 2018-09-18 RX ORDER — PANTOPRAZOLE SODIUM 40 MG/1
40 TABLET, DELAYED RELEASE ORAL
Status: DISCONTINUED | OUTPATIENT
Start: 2018-09-19 | End: 2018-09-18

## 2018-09-18 RX ORDER — DONEPEZIL HYDROCHLORIDE 5 MG/1
5 TABLET, FILM COATED ORAL NIGHTLY
Status: DISCONTINUED | OUTPATIENT
Start: 2018-09-18 | End: 2018-09-26 | Stop reason: HOSPADM

## 2018-09-18 RX ORDER — ONDANSETRON 2 MG/ML
4 INJECTION INTRAMUSCULAR; INTRAVENOUS EVERY 6 HOURS PRN
Status: DISCONTINUED | OUTPATIENT
Start: 2018-09-18 | End: 2018-09-22

## 2018-09-18 RX ORDER — CHOLECALCIFEROL (VITAMIN D3) 125 MCG
500 CAPSULE ORAL DAILY
Status: DISCONTINUED | OUTPATIENT
Start: 2018-09-19 | End: 2018-09-26 | Stop reason: HOSPADM

## 2018-09-18 RX ORDER — ALPRAZOLAM 0.5 MG/1
0.5 TABLET ORAL NIGHTLY PRN
Status: DISCONTINUED | OUTPATIENT
Start: 2018-09-18 | End: 2018-09-26 | Stop reason: HOSPADM

## 2018-09-18 RX ORDER — ACETAMINOPHEN 325 MG/1
650 TABLET ORAL EVERY 6 HOURS PRN
Status: DISCONTINUED | OUTPATIENT
Start: 2018-09-18 | End: 2018-09-26 | Stop reason: HOSPADM

## 2018-09-18 RX ORDER — 0.9 % SODIUM CHLORIDE 0.9 %
1000 INTRAVENOUS SOLUTION INTRAVENOUS ONCE
Status: COMPLETED | OUTPATIENT
Start: 2018-09-18 | End: 2018-09-18

## 2018-09-18 RX ORDER — SODIUM CHLORIDE 0.9 % (FLUSH) 0.9 %
10 SYRINGE (ML) INJECTION EVERY 12 HOURS SCHEDULED
Status: DISCONTINUED | OUTPATIENT
Start: 2018-09-18 | End: 2018-09-26 | Stop reason: HOSPADM

## 2018-09-18 RX ORDER — DIPHENOXYLATE HYDROCHLORIDE AND ATROPINE SULFATE 2.5; .025 MG/1; MG/1
1 TABLET ORAL 2 TIMES DAILY PRN
Status: ON HOLD | COMMUNITY
End: 2018-10-17 | Stop reason: HOSPADM

## 2018-09-18 RX ORDER — SODIUM CHLORIDE 9 MG/ML
INJECTION, SOLUTION INTRAVENOUS CONTINUOUS
Status: DISCONTINUED | OUTPATIENT
Start: 2018-09-18 | End: 2018-09-19

## 2018-09-18 RX ADMIN — SODIUM CHLORIDE: 9 INJECTION, SOLUTION INTRAVENOUS at 20:16

## 2018-09-18 RX ADMIN — DONEPEZIL HYDROCHLORIDE 5 MG: 5 TABLET, FILM COATED ORAL at 21:34

## 2018-09-18 RX ADMIN — FIDAXOMICIN 200 MG: 200 TABLET, FILM COATED ORAL at 22:18

## 2018-09-18 RX ADMIN — SODIUM CHLORIDE 1000 ML: 9 INJECTION, SOLUTION INTRAVENOUS at 15:10

## 2018-09-18 RX ADMIN — Medication 10 ML: at 21:36

## 2018-09-18 RX ADMIN — ALPRAZOLAM 0.5 MG: 0.5 TABLET ORAL at 21:34

## 2018-09-18 NOTE — ED PROVIDER NOTES
- Abnormal; Notable for the following:     Potassium 3.3 (*)     Glucose 107 (*)     BUN 50 (*)     CREATININE 2.9 (*)     GFR Non- 21 (*)     GFR  25 (*)     Total Protein 5.4 (*)     Alb 2.8 (*)     ALT 6 (*)     AST 11 (*)     All other components within normal limits    Narrative:     Performed at:  [facility] [address],  [place], [facility]   Phone [phone]   C DIFF TOXIN/ANTIGEN   URINE RT REFLEX TO CULTURE    Narrative:     Performed at:  [facility]) [facility] [address],  [place], [facility]   Phone [phone]   BLOOD OCCULT STOOL DIAGNOSTIC         All other labs were within normal range or not returned as of this dictation. EKG: All EKG's are interpreted by the Emergency Department Physician    1. Paste 70, no ST elevations, right bundle-branch block, unable to see prior EKG    RADIOLOGY:   Non-plain film images such as CT, Ultrasound and MRI are read by the radiologist. Plain radiographic images are visualized and preliminarily interpreted by the  ED Provider with the below findings:      Interpretation per the Radiologist below, if available at the time of this note:    XR CHEST STANDARD (2 VW)   Final Result   Irregular linear opacities are seen within both lungs which may be related to   chronic fibrosis/interstitial disease with mild superimposed airspace disease   not excluded.              PROCEDURES   Unless otherwise noted below, none     Procedures      MDM & COURSE:   Vitals:    Vitals:    09/18/18 1436 09/18/18 1600 09/18/18 1702   BP: 101/79 95/60 (!) 91/57   Pulse: 72 70 73   Resp: 18 12 20   Temp: 97.6 °F (36.4 °C)     TempSrc: Oral     SpO2: 100%  93%   Weight: 155 lb (70.3 kg)     Height: 5' 6\" (1.676 m)         Patient was given the following medications:  Medications   0.9 % sodium chloride bolus (0 mLs Intravenous Stopped 9/18/18 1708)       Patient admitted for acute renal failure with creatinine 2.9 increased from 2, generalized weakness and mild confusion. Symptoms have progressed of the week. Decreased p.o. intake likely causing renal failure and weakness. Elevated white count. Cause white count is unknown at this time. Chest x-ray does not show significant infiltrate and the patient does not have a cough or difficulty breathing. UA negative. C. diff testing ordered. Patient may have C. diff again. Antibiotics currently not indicated without definitive C. diff. Given IV fluids. Admitted by hospitalist.    Has colonoscopy scheduled for Friday. Course:  1. Normal vitals. No distress. Discussed all test results, treatment, clinical impression with patient and wife. Discussed plan of care. Agreed with plan. CRITICAL CARE TIME     N/A      CONSULTS    IP CONSULT TO CASE MANAGEMENT  IP CONSULT TO HOSPITALIST      CLINICAL IMPRESSION      1. Acute renal failure, unspecified acute renal failure type ([address]    2. Diarrhea of presumed infectious origin    3. FTT (failure to thrive) in adult    4.  Hx of Clostridium difficile infection              DISPOSITION:    Admit    PATIENT REFERRED TO:  Adrienne Calabrese MD  [id number] [place]  [phone]            DISCHARGE MEDICATIONS:  New Prescriptions    No medications on file              (Please note that portions of this note were completed with a voice recognition program.  Efforts were made to edit the dictations but occasionally words are mis-transcribed.)    Mindy Gutierrez MD (electronically signed)             Mindy Gutierrez MD  09/18/18 1062

## 2018-09-18 NOTE — ED NOTES
Perfect serve message to Dr. Bharat Jon @ [address] [place] [address] [facility]  09/18/18 0524

## 2018-09-19 ENCOUNTER — APPOINTMENT (OUTPATIENT)
Dept: CT IMAGING | Age: 83
DRG: 372 | End: 2018-09-19
Payer: COMMERCIAL

## 2018-09-19 PROBLEM — E44.0 MODERATE PROTEIN-CALORIE MALNUTRITION (HCC): Status: ACTIVE | Noted: 2018-09-19

## 2018-09-19 LAB
ANION GAP SERPL CALCULATED.3IONS-SCNC: 15 MMOL/L (ref 3–16)
BANDED NEUTROPHILS RELATIVE PERCENT: 24 % (ref 0–7)
BASOPHILS ABSOLUTE: 0.2 K/UL (ref 0–0.2)
BASOPHILS RELATIVE PERCENT: 1 %
BUN BLDV-MCNC: 45 MG/DL (ref 7–20)
CALCIUM SERPL-MCNC: 7.5 MG/DL (ref 8.3–10.6)
CHLORIDE BLD-SCNC: 110 MMOL/L (ref 99–110)
CO2: 18 MMOL/L (ref 21–32)
CREAT SERPL-MCNC: 2.4 MG/DL (ref 0.8–1.3)
EOSINOPHILS ABSOLUTE: 0.2 K/UL (ref 0–0.6)
EOSINOPHILS RELATIVE PERCENT: 1 %
GFR AFRICAN AMERICAN: 31
GFR NON-AFRICAN AMERICAN: 26
GI BACTERIAL PATHOGENS BY PCR: NORMAL
GLUCOSE BLD-MCNC: 85 MG/DL (ref 70–99)
HCT VFR BLD CALC: 34 % (ref 40.5–52.5)
HEMOGLOBIN: 11.3 G/DL (ref 13.5–17.5)
LYMPHOCYTES ABSOLUTE: 0.9 K/UL (ref 1–5.1)
LYMPHOCYTES RELATIVE PERCENT: 4 %
MAGNESIUM: 2 MG/DL (ref 1.8–2.4)
MCH RBC QN AUTO: 32 PG (ref 26–34)
MCHC RBC AUTO-ENTMCNC: 33.1 G/DL (ref 31–36)
MCV RBC AUTO: 96.7 FL (ref 80–100)
MONOCYTES ABSOLUTE: 0.9 K/UL (ref 0–1.3)
MONOCYTES RELATIVE PERCENT: 4 %
NEUTROPHILS ABSOLUTE: 20.5 K/UL (ref 1.7–7.7)
NEUTROPHILS RELATIVE PERCENT: 66 %
PDW BLD-RTO: 16.4 % (ref 12.4–15.4)
PLATELET # BLD: 309 K/UL (ref 135–450)
PLATELET SLIDE REVIEW: ADEQUATE
PMV BLD AUTO: 7.8 FL (ref 5–10.5)
POTASSIUM REFLEX MAGNESIUM: 2.7 MMOL/L (ref 3.5–5.1)
RBC # BLD: 3.52 M/UL (ref 4.2–5.9)
SLIDE REVIEW: ABNORMAL
SODIUM BLD-SCNC: 143 MMOL/L (ref 136–145)
WBC # BLD: 22.8 K/UL (ref 4–11)

## 2018-09-19 PROCEDURE — 36415 COLL VENOUS BLD VENIPUNCTURE: CPT

## 2018-09-19 PROCEDURE — G0008 ADMIN INFLUENZA VIRUS VAC: HCPCS | Performed by: INTERNAL MEDICINE

## 2018-09-19 PROCEDURE — 6360000002 HC RX W HCPCS: Performed by: INTERNAL MEDICINE

## 2018-09-19 PROCEDURE — 97530 THERAPEUTIC ACTIVITIES: CPT

## 2018-09-19 PROCEDURE — 2500000003 HC RX 250 WO HCPCS: Performed by: INTERNAL MEDICINE

## 2018-09-19 PROCEDURE — 2580000003 HC RX 258: Performed by: INTERNAL MEDICINE

## 2018-09-19 PROCEDURE — 90686 IIV4 VACC NO PRSV 0.5 ML IM: CPT | Performed by: INTERNAL MEDICINE

## 2018-09-19 PROCEDURE — 93010 ELECTROCARDIOGRAM REPORT: CPT | Performed by: INTERNAL MEDICINE

## 2018-09-19 PROCEDURE — 99221 1ST HOSP IP/OBS SF/LOW 40: CPT | Performed by: INTERNAL MEDICINE

## 2018-09-19 PROCEDURE — 97161 PT EVAL LOW COMPLEX 20 MIN: CPT

## 2018-09-19 PROCEDURE — 6370000000 HC RX 637 (ALT 250 FOR IP): Performed by: INTERNAL MEDICINE

## 2018-09-19 PROCEDURE — 97166 OT EVAL MOD COMPLEX 45 MIN: CPT

## 2018-09-19 PROCEDURE — G8979 MOBILITY GOAL STATUS: HCPCS

## 2018-09-19 PROCEDURE — 99232 SBSQ HOSP IP/OBS MODERATE 35: CPT | Performed by: INTERNAL MEDICINE

## 2018-09-19 PROCEDURE — 74176 CT ABD & PELVIS W/O CONTRAST: CPT

## 2018-09-19 PROCEDURE — 85025 COMPLETE CBC W/AUTO DIFF WBC: CPT

## 2018-09-19 PROCEDURE — 2580000003 HC RX 258: Performed by: PHYSICIAN ASSISTANT

## 2018-09-19 PROCEDURE — 80048 BASIC METABOLIC PNL TOTAL CA: CPT

## 2018-09-19 PROCEDURE — G8988 SELF CARE GOAL STATUS: HCPCS

## 2018-09-19 PROCEDURE — 1200000000 HC SEMI PRIVATE

## 2018-09-19 PROCEDURE — 83735 ASSAY OF MAGNESIUM: CPT

## 2018-09-19 PROCEDURE — G8978 MOBILITY CURRENT STATUS: HCPCS

## 2018-09-19 PROCEDURE — G8987 SELF CARE CURRENT STATUS: HCPCS

## 2018-09-19 PROCEDURE — 2500000003 HC RX 250 WO HCPCS: Performed by: PHYSICIAN ASSISTANT

## 2018-09-19 RX ORDER — POTASSIUM CHLORIDE 20 MEQ/1
20 TABLET, EXTENDED RELEASE ORAL 2 TIMES DAILY
Status: DISCONTINUED | OUTPATIENT
Start: 2018-09-19 | End: 2018-09-26 | Stop reason: HOSPADM

## 2018-09-19 RX ORDER — POTASSIUM CHLORIDE 7.45 MG/ML
10 INJECTION INTRAVENOUS
Status: COMPLETED | OUTPATIENT
Start: 2018-09-19 | End: 2018-09-19

## 2018-09-19 RX ORDER — 0.9 % SODIUM CHLORIDE 0.9 %
500 INTRAVENOUS SOLUTION INTRAVENOUS ONCE
Status: COMPLETED | OUTPATIENT
Start: 2018-09-19 | End: 2018-09-20

## 2018-09-19 RX ORDER — POTASSIUM CHLORIDE 29.8 MG/ML
20 INJECTION INTRAVENOUS
Status: DISCONTINUED | OUTPATIENT
Start: 2018-09-19 | End: 2018-09-19

## 2018-09-19 RX ADMIN — POTASSIUM CHLORIDE 10 MEQ: 7.46 INJECTION, SOLUTION INTRAVENOUS at 12:20

## 2018-09-19 RX ADMIN — ALPRAZOLAM 0.5 MG: 0.5 TABLET ORAL at 23:58

## 2018-09-19 RX ADMIN — METOPROLOL TARTRATE 25 MG: 25 TABLET ORAL at 09:52

## 2018-09-19 RX ADMIN — POTASSIUM CHLORIDE 10 MEQ: 7.46 INJECTION, SOLUTION INTRAVENOUS at 13:52

## 2018-09-19 RX ADMIN — Medication 500 MG: at 23:59

## 2018-09-19 RX ADMIN — POTASSIUM CHLORIDE 10 MEQ: 7.46 INJECTION, SOLUTION INTRAVENOUS at 15:26

## 2018-09-19 RX ADMIN — METRONIDAZOLE 500 MG: 500 INJECTION, SOLUTION INTRAVENOUS at 16:11

## 2018-09-19 RX ADMIN — Medication 10 ML: at 20:14

## 2018-09-19 RX ADMIN — SODIUM CHLORIDE 500 ML: 9 INJECTION, SOLUTION INTRAVENOUS at 22:51

## 2018-09-19 RX ADMIN — INFLUENZA A VIRUS A/MICHIGAN/45/2015 X-275 (H1N1) ANTIGEN (FORMALDEHYDE INACTIVATED), INFLUENZA A VIRUS A/SINGAPORE/INFIMH-16-0019/2016 IVR-186 (H3N2) ANTIGEN (FORMALDEHYDE INACTIVATED), INFLUENZA B VIRUS B/PHUKET/3073/2013 ANTIGEN (FORMALDEHYDE INACTIVATED), AND INFLUENZA B VIRUS B/MARYLAND/15/2016 BX-69A ANTIGEN (FORMALDEHYDE INACTIVATED) 0.5 ML: 15; 15; 15; 15 INJECTION, SUSPENSION INTRAMUSCULAR at 09:56

## 2018-09-19 RX ADMIN — SODIUM CHLORIDE: 9 INJECTION, SOLUTION INTRAVENOUS at 02:21

## 2018-09-19 RX ADMIN — Medication 500 MG: at 15:26

## 2018-09-19 RX ADMIN — METRONIDAZOLE 500 MG: 500 INJECTION, SOLUTION INTRAVENOUS at 08:13

## 2018-09-19 RX ADMIN — POTASSIUM CHLORIDE 10 MEQ: 7.46 INJECTION, SOLUTION INTRAVENOUS at 16:26

## 2018-09-19 RX ADMIN — FIDAXOMICIN 200 MG: 200 TABLET, FILM COATED ORAL at 09:47

## 2018-09-19 RX ADMIN — AMIODARONE HYDROCHLORIDE 200 MG: 200 TABLET ORAL at 09:52

## 2018-09-19 RX ADMIN — Medication 500 MG: at 17:46

## 2018-09-19 RX ADMIN — POTASSIUM CHLORIDE 10 MEQ: 7.46 INJECTION, SOLUTION INTRAVENOUS at 10:26

## 2018-09-19 RX ADMIN — METRONIDAZOLE 500 MG: 500 INJECTION, SOLUTION INTRAVENOUS at 23:59

## 2018-09-19 RX ADMIN — SODIUM BICARBONATE: 84 INJECTION, SOLUTION INTRAVENOUS at 20:14

## 2018-09-19 RX ADMIN — POTASSIUM CHLORIDE 20 MEQ: 20 TABLET, EXTENDED RELEASE ORAL at 09:46

## 2018-09-19 RX ADMIN — POTASSIUM CHLORIDE 20 MEQ: 20 TABLET, EXTENDED RELEASE ORAL at 20:14

## 2018-09-19 RX ADMIN — CYANOCOBALAMIN TAB 500 MCG 500 MCG: 500 TAB at 09:47

## 2018-09-19 RX ADMIN — SODIUM BICARBONATE: 84 INJECTION, SOLUTION INTRAVENOUS at 11:16

## 2018-09-19 RX ADMIN — DONEPEZIL HYDROCHLORIDE 5 MG: 5 TABLET, FILM COATED ORAL at 20:14

## 2018-09-19 NOTE — PROGRESS NOTES
deficits. Coordination and Tone  WNL    Balance  Static Sitting: Good at EOB, SBA   Sitting Tolerance: ~ 15 min  Dynamic Sitting: Good (-) at EOB  Static Standing: DNT  Dynamic Standing: DNT    Bed mobility    Supine to sit: Mod A of 2 persons  Sit to supine: Min A   Scooting to head of bed: DNT  Scooting in sitting: CGA to EOB  Rolling: Min A     Transfers  Deferred, pt declined 2/2 to fatigue  Sit to stand:   Stand to sit:  Bed to chair:    Gait  Deferred, pt declined 2/2 to fatigue  Ambulated with   Gait deviations included: Activity Tolerance   No c/o of dizziness or shortness of breath    Positioning Needs   Pt L sidelying in bed (pillow positioned to minimize pressure on L hip), call light and needs within reach. Patient/Family Education   Role of acute care PT, POC, benefits of mobility and risks of immobilization    G-CODEs:  PT G-Codes  Functional Assessment Tool Used: AM-PAC  Score: 12  Functional Limitation: Mobility: Walking and moving around  Mobility: Walking and Moving Around Current Status (): At least 60 percent but less than 80 percent impaired, limited or restricted  Mobility: Walking and Moving Around Goal Status (): At least 40 percent but less than 60 percent impaired, limited or restricted      Assessment of Deficits  Pt demonstrated decreased activity tolerance decreased safety awareness, decreased strength, decreased balance, and decreased independence with bed mobility, transfers and gait. Recommend SNF at discharge to safely progress IND with functional activity tolerance. Pt. Limited during evaluation by fatigue  At end of evaluation, pt. In bed with call light and needs within reach. Goal(s) : To be met in 3 visits:  1). Independent with LE Ex x 10 reps    To be met in 6 visits:  1). Supine to/from sit: Min A   2). Sit to/from stand: Min A   3). Bed to chair: min A   4). Gait x 25' with RW and min A   5).   Tolerate B LE exercises 10 reps    Rehabilitation Potential   Good for goals listed above. Strengths for achieving goals include: PLOF, cooperation with therapy  Barriers to achieving goals include: fatigue    Plan    To be seen 5x/week while in acute care setting for therapeutic exercises, bed mobility, transfers, progressive gait training, balance training, and family/patient education. Timed Code Treatment Minutes:  22 minutes  Total Treatment Time:   32 minutes    Ninfa Minor PT, DPT #[id number]      If patient discharges from this facility prior to next visit, this note will serve as the Discharge Summary.

## 2018-09-19 NOTE — PROGRESS NOTES
Perfect served Dr Alexandria Elizondo to make aware Pt has HX of A-Fib takes Eliquis has out patient colostomy scheduled for Friday, per pt and spouse starting today Eliquis on hold until after procedure. Per Dr Alexandria Elizondo OK to hold Eliquis. Will cont to monitor.

## 2018-09-19 NOTE — PLAN OF CARE
Problem: Falls - Risk of:  Goal: Will remain free from falls  Will remain free from falls   Outcome: Ongoing  No falls, continue preventative measures. Problem: Risk for Impaired Skin Integrity  Goal: Tissue integrity - skin and mucous membranes  Structural intactness and normal physiological function of skin and  mucous membranes. Outcome: Ongoing  Pt with moisture ulcer gluteal fold. Redness on left hip. Pt has to be encouraged to try to stay off of left hip. Scattered bruising. Problem: FLUID AND ELECTROLYTE IMBALANCE  Goal: Fluid and electrolyte balance are achieved/maintained  Outcome: Ongoing  K+ 2.7 today. Received 20meq of oral KCL & total of 50meq IVPB. Problem: Bowel/Gastric:  Goal: Occurrences of diarrhea will decrease  Occurrences of diarrhea will decrease  Outcome: Ongoing  Pt has 3 episodes of diarrhea this shift. Started on oral vancomycin to replace previous po antibiotic. Remains on IV Flagyl.

## 2018-09-19 NOTE — CONSULTS
Sister     Heart Attack Son 52    Hypertension Grandchild      SOCIAL HISTORY     Social History     Social History    Marital status:      Spouse name: N/A    Number of children: N/A    Years of education: N/A     Occupational History    Not on file. Social History Main Topics    Smoking status: Never Smoker    Smokeless tobacco: Never Used    Alcohol use No    Drug use: No    Sexual activity: Yes     Partners: Female     Other Topics Concern    Not on file     Social History Narrative    No narrative on file     SURGICAL HISTORY     Past Surgical History:   Procedure Laterality Date    APPENDECTOMY      CARDIAC DEFIBRILLATOR PLACEMENT      CARDIAC SURGERY Left     5/13/17    CATARACT REMOVAL WITH IMPLANT Right 06/17/2017    [facility]/Dr Barbi Castillo    CHOLECYSTECTOMY      HERNIA REPAIR Bilateral     PACEMAKER INSERTION      TONSILLECTOMY       CURRENT MEDICATIONS   (This list may include medications prescribed during this encounter as epic can not insert only the list prior to this encounter.)     ALLERGIES     Allergies   Allergen Reactions    Warfarin Itching       REVIEW OF SYSTEMS   12 point ROS done and negative except as mentioned in the HPI. PHYSICAL EXAM   BP (!) 93/54 Comment: small cuff  Pulse 100   Temp 96.8 °F (36 °C) (Oral)   Resp 15   Ht 5' 7\" (1.702 m)   Wt 138 lb 1.6 oz (62.6 kg)   SpO2 97%   BMI 21.63 kg/m²   Wt Readings from Last 3 Encounters:   09/19/18 138 lb 1.6 oz (62.6 kg)   08/21/18 147 lb 6.4 oz (66.9 kg)   08/01/18 149 lb (67.6 kg)     Constitutional: AAO3, frail elderly man in no acute distress  HENT: no pallor or icterus. Neck: Supple, No stridor. Cardiovascular: Normal heart rate, Normal rhythm,    Thorax & Lungs: Normal breath sounds, air entry reduced both LL. Abdomen: soft, non tender, not distended, BS+. No hepatosplenomegaly.   Extremities:  1+bilateral LE edema    Recent Labs      09/18/18   1510  09/19/18   0607   WBC  26.5*  22.8* HGB  12.8*  11.3*   MCV  96.0  96.7   PLT  314  309   INR  1.51*   --    NA  145  143   K  3.3*  2.7*   CL  108  110   CO2  22  18*   BUN  50*  45*   CREATININE  2.9*  2.4*   GLUCOSE  107*  85   CALCIUM  8.3  7.5*   PROT  5.4*   --    LABALBU  2.8*   --    AST  11*   --    ALT  6*   --    ALKPHOS  61   --    BILITOT  0.8   --    MG   --   2.00       FINAL IMPRESSION     Diarrhea, recurrent C diff colitis - symptoms of intermittent diarrhea since 1/2018 with multiple earlier C diff infections. Last one through the office 8/30/2018 was negative. But admitted now with recurrent diarrhea and C diff POSITIVE stool on testing 9/18/18. Has significant leukocytosis and worsened creatinine from baseline in addition but both these are improving. Denies abdominal pain. Agree with Fidaxomicin PO. Add IV Flagyl 500 mg every 8 hours. There was a colonoscopy planned (as outpatient) on this Friday but given the documented C diff recurrence we will try to investigate options for a fecal microbiota transplant (the donor feces has to be ordered so will see if we can get this Friday, if not then we may have to treat this episode and consider FMT as outpatient likely next week).     Will followup on CT ordered by Med team.

## 2018-09-19 NOTE — CARE COORDINATION
INTERDISCIPLINARY PLAN OF CARE CONFERENCE    Date/Time: 9/19/2018 1:46 PM  Completed by: Evie Marroquin Case Management      Patient Name:  Eladio Trevino  YOB: 1934  Admitting Diagnosis: Acute renal failure (ARF) ([facility] [N17.9]     Admit Date/Time:  9/18/2018  2:19 PM    Chart reviewed. Interdisciplinary team met to discuss patient progress and discharge plans. Disciplines included Case Management, Nursing, and Dietitian. Current Status: Inpatient 9/18/2018    Anticipated Discharge Date: Inpatient  Expected D/C Disposition:  Skilled nursing facility  Confirmed plan with patient and/or family Yes  Discharge Plan Comments: Chart reviewed. Met with pt and spouse at bedside and explained the role of the CM. [facility] for [facility]. Face sheet and clinicals faxed to [phone].  +eCOC, +HENS    Home O2 in place on admit: No  Pt informed of need to bring portable home O2 tank on day of discharge for nursing to connect prior to leaving:  No  Verbalized agreement/Understanding:  No

## 2018-09-19 NOTE — CARE COORDINATION
Case Management Assessment  Initial Evaluation    Date/Time of Evaluation: 9/19/2018 1:07 PM  Assessment Completed by: Tc Christopher    Patient Name: Baldemar He  YOB: 1934  Diagnosis: Acute renal failure (ARF) ([facility] [address] [N17.9]  Date / Time: 9/18/2018  2:19 PM  Admission status/Date: 09/18/2018 inpatient   Chart Reviewed: Yes      Patient Interviewed: Yes   Family Interviewed:  No      Hospitalization in the last 30 days:  No    Contacts  : Daisy Campos   Relationship to Patient:spouse  Phone Number: [phone]  Alternate Contact: Yinka Parr   Relationship to Patient: daughter   Phone Number: [phone]    Current PCP Dr Torrie Boyer: Children, Spouse/Significant Other  Transportation: self    Meal Preparation: self and spouse     Housing  Home Environment: lives with his spouse in a one story home   Steps:  3 steps to entry   Plans to Return to Present Housing: Yes  Other Identified Issues: none     Yarelis [place]  Currently active with [address] : No  Type of Home Care Services: None  Passport/Waiver : No  Passport/Waiver Services: none     Durable Medical Equipment   DME Provider:   Equipment: walker , wheelchair, cane     Has Home O2 in place on admit:  No  Informed of need to bring portable home O2 tank on day of discharge for nursing to connect prior to leaving:   Not Indicated  Verbalized agreement/Understanding:   Not Indicated    Community Service Affiliation  Dialysis:  No  Outpatient PT/OT: No    Cancer Center: No     CHF Clinic: No     Pulmonary Rehab: No  Pain Clinic: No  Community Mental Health: No    Wound Clinic: No     Other:       DISCHARGE PLAN: Chart reviewed. Spoke with the patient at the bedside. Patient reports that he resides with his spouse in a one story home and plans return following his hospitalization. He does not anticipate any needs at this time.  CM will follow and assist with any needs that arise.

## 2018-09-19 NOTE — PROGRESS NOTES
4 Eyes Skin Assessment     The patient is being assess for   Admission    I agree that 2 RN's have performed a thorough Head to Toe Skin Assessment on the patient. ALL assessment sites listed below have been assessed. Areas assessed by both nurses:   [x]   Head, Face, and Ears   [x]   Shoulders, Back, and Chest, Abdomen  [x]   Arms, Elbows, and Hands   [x]   Coccyx, Sacrum, and Ischium  [x]   Legs, Feet, and Heels         Kaylyn Eldridge RN performed 4-eyes revealed scattered bruising and abrasions , Stage II coccyx and Stage I left hip mepilex applied. See wound floowsheet for measurements/ description. **SHARE this note so that the co-signing nurse is able to place an eSignature**    Co-signer eSignature: Electronically signed by Monica Tolbert RN on 9/19/18 at 2:12 AM    Does the Patient have Skin Breakdown?   Yes LDA WOUND CARE was Initiated documentation include the Alejandra-wound, Wound Assessment, Measurements, Dressing Treatment, Drainage, and Color\",          Duarte Prevention initiated:  Yes   Wound Care Orders initiated:  Yes      [address]  nurse consulted for Pressure Injury (Stage 3,4, Unstageable, DTI, NWPT, Complex wounds)and New or Established Ostomies:  No      Primary Nurse eSignature: Electronically signed by Zainab Hodges RN on 9/18/18 at 11:03 PM

## 2018-09-19 NOTE — PROGRESS NOTES
kg)  · Admission Body Wt: 138 lb (62.6 kg)  · Usual Body Wt: 160 lb (72.6 kg)  · % Weight Change:  (13 loss),   (x 6 months )  · Ideal Body Wt: 148 lb (67.1 kg), % Ideal Body  (91)  · Adjusted Body Wt:  , body weight adjusted for    · BMI Classification: BMI 18.5 - 24.9 Normal Weight  · Comparative Standards (Estimated Nutrition Needs):  · Estimated Daily Total Kcal: 5815=0073  · Estimated Daily Protein (g): 50-63    Estimated Intake vs Estimated Needs: Intake Improving    Nutrition Risk Level: Moderate    Nutrition Interventions:   Continue current diet, Start ONS  Continued Inpatient Monitoring, Coordination of Care, Coordination of Community Care    Nutrition Evaluation:   · Evaluation: Goals set   · Goals: pt will consume > 50% of meals and the magic cups and bee free of loose stools with his body weight increasing to 140#     · Monitoring: Meal Intake, Supplement Intake, Diet Tolerance, Pertinent Labs, Diarrhea, Weight    See Adult Nutrition Doc Flowsheet for more detail.      Electronically signed by Daija Lanza RD, LD on 9/19/18 at 7:07 PM    Contact Number: [phone]

## 2018-09-19 NOTE — H&P
Take 200 mg by mouth 2 times daily 18   Myrtie Schwab, MD   mirtazapine (REMERON) 30 MG tablet Take 1 tablet by mouth nightly 18  Myrtie Schwab, MD   ammonium lactate (LAC-HYDRIN) 12 % lotion Apply topically daily. 4/10/18   Myrtie Schwab, MD       Allergies:  Warfarin    Social History:       reports that he has never smoked. He has never used smokeless tobacco. He reports that he does not drink alcohol or use drugs. Family History:  Reviewed in detail and negative for DM, Early CAD, Cancer, CVA. Positive as follows:    Family History   Problem Relation Age of Onset    Heart Disease Mother         abn valve    Coronary Art Dis Father          of MI    Stroke Sister     Hypertension Sister     Heart Attack Son 52    Hypertension Grandchild        REVIEW OF SYSTEMS:   Positive review  noted in the HPI. All other systems reviewed and negative.     PHYSICAL EXAM:    /61   Pulse 105   Temp 96.8 °F (36 °C) (Oral)   Resp 16   Ht 5' 7\" (1.702 m)   Wt 138 lb 4.8 oz (62.7 kg)   SpO2 100%   BMI 21.66 kg/m²   General Appearance: alert and oriented to person, place and time, well developed and well- nourished, in no acute distress  Skin: warm and dry, no rash or erythema  Head: normocephalic and atraumatic  Eyes: pupils equal, round, and reactive to light, extraocular eye movements intact, conjunctivae normal  ENT: tympanic membrane, external ear and ear canal normal bilaterally, nose without deformity, nasal mucosa and turbinates normal without polyps  Neck: supple and non-tender without mass, no thyromegaly or thyroid nodules, no cervical lymphadenopathy  Pulmonary/Chest: clear to auscultation bilaterally- no wheezes, rales or rhonchi, normal air movement, no respiratory distress  Cardiovascular: normal rate, regular rhythm, normal S1 and S2, no murmurs, rubs, clicks, or gallops, Peripheral pulses good, Cap refill <3 sec, no carotid bruits  Abdomen: soft, non-tender, non-distended, normal bowel sounds, no masses or organomegaly  Extremities: no cyanosis, clubbing or edema  Musculoskeletal: normal range of motion, no joint swelling, deformity or tenderness  Neurologic: reflexes normal and symmetric, no cranial nerve deficit, gait, coordination and speech normal      LABS:    CXR:  I have reviewed the CXR     EKG:  I have reviewed the EKG   CBC   Recent Labs      09/18/18   1510   WBC  26.5*   HGB  12.8*   HCT  38.7*   PLT  314      RENAL  Recent Labs      09/18/18   1510   NA  145   K  3.3*   CL  108   CO2  22   BUN  50*   CREATININE  2.9*     LFT'S  Recent Labs      09/18/18   1510   AST  11*   ALT  6*   BILITOT  0.8   ALKPHOS  61     COAG  Recent Labs      09/18/18   1510   INR  1.51*     CARDIAC ENZYMES  No results for input(s): CKTOTAL, CKMB, CKMBINDEX, TROPONINI in the last 72 hours. U/A:    Lab Results   Component Value Date    COLORU Yellow 09/18/2018    MUCUS NEGATIVE 05/05/2018    BACTERIA MODERATE 05/05/2018    CLARITYU Clear 09/18/2018    SPECGRAV 1.020 09/18/2018    LEUKOCYTESUR Negative 09/18/2018    BLOODU Negative 09/18/2018    GLUCOSEU Negative 09/18/2018    AMORPHOUS NEGATIVE 05/05/2018       ABG  No results found for: VPS6AOT, BEART, X0DSKSIA, PHART, THGBART, FNF1CMR, PO2ART, NEH8MIJ    UA:  Recent Labs      09/18/18   1627   COLORU  Yellow   PHUR  5.5   CLARITYU  Clear   SPECGRAV  1.020   LEUKOCYTESUR  Negative   UROBILINOGEN  0.2   BILIRUBINUR  Negative   BLOODU  Negative   GLUCOSEU  Negative   KETUA  Negative       Microbiology:  No results for input(s): LABGRAM, LABANAE, ORG, CXSURG in the last 72 hours. Nasal Culture: No results for input(s): ORG, MRSAPCR in the last 72 hours. Blood Culture: No results for input(s): BC, BLOODCULT2, ORG in the last 72 hours. Fungal Culture:   No results for input(s): FUNGSM in the last 72 hours. No results for input(s): FUNCXBLD in the last 72 hours.   CSF Culture:  No results for input(s): COLORCSF, APPEARCSF, CFTUBE, CLOTCSF, WBCCSF, RBCCSF, NEUTCSF, NUMCELLSCSF, LYMPHSCSF, MONOCSF, GLUCCSF, VOLCSF in the last 72 hours. Respiratory Culture:  No results for input(s): Clarine Dad in the last 72 hours. AFB:No results for input(s): AFBSMEAR in the last 72 hours. Urine Culture  No results for input(s): LABURIN in the last 72 hours. RADIOLOGY:    XR CHEST STANDARD (2 VW)   Final Result   Irregular linear opacities are seen within both lungs which may be related to   chronic fibrosis/interstitial disease with mild superimposed airspace disease   not excluded. CT ABDOMEN PELVIS WO CONTRAST Additional Contrast? None    (Results Pending)       Previous medical records personally reviewed and analyzed         PHYSICIAN CERTIFICATION    I certify that Maurizio Mckinney is expected to be hospitalized for 2  midnights based on the following assessment and plan:    ASSESSMENT/PLAN:  Active Hospital Problems    Diagnosis Date Noted    Acute renal failure (ARF) ([facility] [N17.9] 09/18/2018     Chronic c diff  afib  chf     Npo  ivf  abx for c diff  Stool cx  Stool for c diff  Ct abdomen  Gi consult    Resume home meds        DVT Prophylaxis: none   Diet: DIET GENERAL;  Code Status: Full Code      Dispo - home        Francy Amor MD  The note was completed using EMR. Every effort was made to ensure accuracy; however, inadvertent computerized transcription errors may be present. Thank you Janeice Goodell, MD for the opportunity to be involved in this patient's care. If you have any questions or concerns please feel free to contact me at [phone].

## 2018-09-19 NOTE — PROGRESS NOTES
Bedside report given to [facility] RN, no signs of distress noted. Pt denies needs.

## 2018-09-20 ENCOUNTER — APPOINTMENT (OUTPATIENT)
Dept: GENERAL RADIOLOGY | Age: 83
DRG: 372 | End: 2018-09-20
Payer: COMMERCIAL

## 2018-09-20 LAB
A/G RATIO: 1 (ref 1.1–2.2)
ALBUMIN SERPL-MCNC: 2.1 G/DL (ref 3.4–5)
ALP BLD-CCNC: 48 U/L (ref 40–129)
ALT SERPL-CCNC: 6 U/L (ref 10–40)
ANION GAP SERPL CALCULATED.3IONS-SCNC: 11 MMOL/L (ref 3–16)
ANISOCYTOSIS: ABNORMAL
AST SERPL-CCNC: 16 U/L (ref 15–37)
BANDED NEUTROPHILS RELATIVE PERCENT: 2 % (ref 0–7)
BASOPHILS ABSOLUTE: 0.2 K/UL (ref 0–0.2)
BASOPHILS RELATIVE PERCENT: 1 %
BILIRUB SERPL-MCNC: 0.6 MG/DL (ref 0–1)
BUN BLDV-MCNC: 37 MG/DL (ref 7–20)
CALCIUM SERPL-MCNC: 7.3 MG/DL (ref 8.3–10.6)
CHLORIDE BLD-SCNC: 105 MMOL/L (ref 99–110)
CO2: 19 MMOL/L (ref 21–32)
CREAT SERPL-MCNC: 1.9 MG/DL (ref 0.8–1.3)
EOSINOPHILS ABSOLUTE: 0.6 K/UL (ref 0–0.6)
EOSINOPHILS RELATIVE PERCENT: 3 %
GFR AFRICAN AMERICAN: 41
GFR NON-AFRICAN AMERICAN: 34
GLOBULIN: 2.2 G/DL
GLUCOSE BLD-MCNC: 105 MG/DL (ref 70–99)
HCT VFR BLD CALC: 34.8 % (ref 40.5–52.5)
HEMOGLOBIN: 11.6 G/DL (ref 13.5–17.5)
LYMPHOCYTES ABSOLUTE: 2.5 K/UL (ref 1–5.1)
LYMPHOCYTES RELATIVE PERCENT: 12 %
MCH RBC QN AUTO: 32.1 PG (ref 26–34)
MCHC RBC AUTO-ENTMCNC: 33.3 G/DL (ref 31–36)
MCV RBC AUTO: 96.3 FL (ref 80–100)
MONOCYTES ABSOLUTE: 1 K/UL (ref 0–1.3)
MONOCYTES RELATIVE PERCENT: 5 %
NEUTROPHILS ABSOLUTE: 16.2 K/UL (ref 1.7–7.7)
NEUTROPHILS RELATIVE PERCENT: 77 %
PDW BLD-RTO: 16.5 % (ref 12.4–15.4)
PLATELET # BLD: 326 K/UL (ref 135–450)
PLATELET SLIDE REVIEW: ADEQUATE
PMV BLD AUTO: 8.3 FL (ref 5–10.5)
POTASSIUM SERPL-SCNC: 3.5 MMOL/L (ref 3.5–5.1)
RBC # BLD: 3.61 M/UL (ref 4.2–5.9)
SLIDE REVIEW: ABNORMAL
SODIUM BLD-SCNC: 135 MMOL/L (ref 136–145)
TOTAL PROTEIN: 4.3 G/DL (ref 6.4–8.2)
WBC # BLD: 20.5 K/UL (ref 4–11)

## 2018-09-20 PROCEDURE — C1751 CATH, INF, PER/CENT/MIDLINE: HCPCS

## 2018-09-20 PROCEDURE — 85025 COMPLETE CBC W/AUTO DIFF WBC: CPT

## 2018-09-20 PROCEDURE — 36556 INSERT NON-TUNNEL CV CATH: CPT

## 2018-09-20 PROCEDURE — 76937 US GUIDE VASCULAR ACCESS: CPT

## 2018-09-20 PROCEDURE — 2580000003 HC RX 258

## 2018-09-20 PROCEDURE — 99232 SBSQ HOSP IP/OBS MODERATE 35: CPT | Performed by: INTERNAL MEDICINE

## 2018-09-20 PROCEDURE — 02HV33Z INSERTION OF INFUSION DEVICE INTO SUPERIOR VENA CAVA, PERCUTANEOUS APPROACH: ICD-10-PCS | Performed by: RADIOLOGY

## 2018-09-20 PROCEDURE — 80053 COMPREHEN METABOLIC PANEL: CPT

## 2018-09-20 PROCEDURE — 2580000003 HC RX 258: Performed by: INTERNAL MEDICINE

## 2018-09-20 PROCEDURE — 2500000003 HC RX 250 WO HCPCS: Performed by: INTERNAL MEDICINE

## 2018-09-20 PROCEDURE — 2500000003 HC RX 250 WO HCPCS: Performed by: PHYSICIAN ASSISTANT

## 2018-09-20 PROCEDURE — 1200000000 HC SEMI PRIVATE

## 2018-09-20 PROCEDURE — 2580000003 HC RX 258: Performed by: PHYSICIAN ASSISTANT

## 2018-09-20 PROCEDURE — 36415 COLL VENOUS BLD VENIPUNCTURE: CPT

## 2018-09-20 PROCEDURE — 6370000000 HC RX 637 (ALT 250 FOR IP): Performed by: INTERNAL MEDICINE

## 2018-09-20 PROCEDURE — 77001 FLUOROGUIDE FOR VEIN DEVICE: CPT

## 2018-09-20 RX ORDER — SODIUM CHLORIDE 9 MG/ML
INJECTION, SOLUTION INTRAVENOUS
Status: COMPLETED
Start: 2018-09-20 | End: 2018-09-20

## 2018-09-20 RX ORDER — 0.9 % SODIUM CHLORIDE 0.9 %
500 INTRAVENOUS SOLUTION INTRAVENOUS ONCE
Status: DISCONTINUED | OUTPATIENT
Start: 2018-09-20 | End: 2018-09-20 | Stop reason: ALTCHOICE

## 2018-09-20 RX ORDER — 0.9 % SODIUM CHLORIDE 0.9 %
250 INTRAVENOUS SOLUTION INTRAVENOUS ONCE
Status: COMPLETED | OUTPATIENT
Start: 2018-09-20 | End: 2018-09-20

## 2018-09-20 RX ADMIN — POTASSIUM CHLORIDE 20 MEQ: 20 TABLET, EXTENDED RELEASE ORAL at 21:00

## 2018-09-20 RX ADMIN — SODIUM BICARBONATE: 84 INJECTION, SOLUTION INTRAVENOUS at 14:20

## 2018-09-20 RX ADMIN — SODIUM BICARBONATE: 84 INJECTION, SOLUTION INTRAVENOUS at 23:52

## 2018-09-20 RX ADMIN — SODIUM CHLORIDE 250 ML: 9 INJECTION, SOLUTION INTRAVENOUS at 16:28

## 2018-09-20 RX ADMIN — Medication 500 MG: at 06:10

## 2018-09-20 RX ADMIN — METRONIDAZOLE 500 MG: 500 INJECTION, SOLUTION INTRAVENOUS at 23:50

## 2018-09-20 RX ADMIN — ALPRAZOLAM 0.5 MG: 0.5 TABLET ORAL at 21:00

## 2018-09-20 RX ADMIN — CYANOCOBALAMIN TAB 500 MCG 500 MCG: 500 TAB at 09:39

## 2018-09-20 RX ADMIN — Medication 500 MG: at 16:27

## 2018-09-20 RX ADMIN — POTASSIUM CHLORIDE 20 MEQ: 20 TABLET, EXTENDED RELEASE ORAL at 09:39

## 2018-09-20 RX ADMIN — DONEPEZIL HYDROCHLORIDE 5 MG: 5 TABLET, FILM COATED ORAL at 21:00

## 2018-09-20 RX ADMIN — Medication 500 MG: at 23:52

## 2018-09-20 RX ADMIN — METRONIDAZOLE 500 MG: 500 INJECTION, SOLUTION INTRAVENOUS at 16:28

## 2018-09-20 RX ADMIN — Medication 10 ML: at 21:00

## 2018-09-20 RX ADMIN — SODIUM CHLORIDE 250 ML: 9 INJECTION, SOLUTION INTRAVENOUS at 23:50

## 2018-09-20 NOTE — FLOWSHEET NOTE
09/20/18 0930   Vital Signs   Temp 96.7 °F (35.9 °C)   Temp Source Oral   Pulse 69   Heart Rate Source Monitor   Resp 18   /76   BP Location Left upper arm   BP Upper/Lower Upper   Patient Position Semi fowlers   Level of Consciousness 0   MEWS Score 1   Oxygen Therapy   SpO2 100 %   O2 Device None (Room air)     Alert and oriented x 4. Respirations easy and even. No respiratory distress noted. Pulse regular and non-bounding. Abdomen soft and non-tender, pt denies pain at this time. Peripheral pulses present. AM PO meds given, Will notify Dr. Chiki Rdz, patient lost IV  access and was unable to complete 2nd bolus.  VSS this morning, Will continue to monitor

## 2018-09-20 NOTE — PLAN OF CARE
Problem: OXYGENATION/RESPIRATORY FUNCTION  Goal: Patient will maintain patent airway  Outcome: Ongoing

## 2018-09-20 NOTE — PROGRESS NOTES
[facility]) Physicians Gastroenterology Progress Note    Patient:   Kathia Garcia   :    1934   Facility:   [facility]     [unfilled]     [place], [place]  [unfilled]  Date:     2018  Consultant:   Isaac Conn MD      Subjective:     Patient was seen at 15 PM today    Present  Diet Order: DIET FULL LIQUID;  Diet NPO, After Midnight Exceptions are: Sips with Meds  Continued to have diarrhea yesterday, says he had 3 BMs today, per nursing no overt blood in stools. Denies abdominal pain. He has dementia but can answer some qs and follow commands. Wife at bedside. Lost IV access, right arm swollen possibly from attempts at PIV. To get a PICC line today. Issues with borderline/low BP got a bolus and BP has come up    Current Medications include:   Scheduled Meds:   metoprolol tartrate  12.5 mg Oral BID    metroNIDAZOLE  500 mg Intravenous Q8H    potassium chloride  20 mEq Oral BID    [START ON 2018] apixaban  2.5 mg Oral BID    vancomycin  500 mg Oral 4 times per day    vitamin B-12  500 mcg Oral Daily    donepezil  5 mg Oral Nightly    amiodarone  200 mg Oral Daily    sodium chloride flush  10 mL Intravenous 2 times per day     Continuous Infusions:   sodium bicarbonate infusion 100 mL/hr at 18 1420     PRN Meds:.acetaminophen, ALPRAZolam, sodium chloride flush, ondansetron    Allergies:    Allergies   Allergen Reactions    Warfarin Itching       Objective:   Vital Signs:  Temp (24hrs), Av.9 °F (36.6 °C), Min:96.7 °F (35.9 °C), Max:99.5 °F (97.0 °C)     Systolic (57ZHE), JOE:10 , Min:85 , TRA:517      Diastolic (71AOP), IUL:06, Min:49, Max:76     Pulse  Av  Min: 69  Max: 100  /76   Pulse 69   Temp 96.7 °F (35.9 °C) (Oral)   Resp 16   Ht 5' 7\" (1.702 m)   Wt 141 lb 2 oz (64 kg)   SpO2 98%   BMI 22.10 kg/m²      Physical Exam:   General appearance: AAO3, NAD  HEENT: no icterus or pallor  RS: air entry reduced LL  CVS: longstanding diarrhea will plan a flex sig tomorrow to confirm diagnosis/evaluate severity of colitis. Discussed risks and benefits with wife today. NPO PMN today, one tap water enema tomorrow before procedure.

## 2018-09-21 ENCOUNTER — ANESTHESIA (OUTPATIENT)
Dept: ENDOSCOPY | Age: 83
DRG: 372 | End: 2018-09-21
Payer: COMMERCIAL

## 2018-09-21 ENCOUNTER — ANESTHESIA EVENT (OUTPATIENT)
Dept: ENDOSCOPY | Age: 83
DRG: 372 | End: 2018-09-21
Payer: COMMERCIAL

## 2018-09-21 LAB
ANION GAP SERPL CALCULATED.3IONS-SCNC: 9 MMOL/L (ref 3–16)
ANISOCYTOSIS: ABNORMAL
BANDED NEUTROPHILS RELATIVE PERCENT: 2 % (ref 0–7)
BASOPHILS ABSOLUTE: 0 K/UL (ref 0–0.2)
BASOPHILS RELATIVE PERCENT: 0 %
BUN BLDV-MCNC: 26 MG/DL (ref 7–20)
CALCIUM SERPL-MCNC: 7.2 MG/DL (ref 8.3–10.6)
CHLORIDE BLD-SCNC: 104 MMOL/L (ref 99–110)
CO2: 23 MMOL/L (ref 21–32)
CREAT SERPL-MCNC: 1.4 MG/DL (ref 0.8–1.3)
EOSINOPHILS ABSOLUTE: 0.1 K/UL (ref 0–0.6)
EOSINOPHILS RELATIVE PERCENT: 1 %
GFR AFRICAN AMERICAN: 58
GFR NON-AFRICAN AMERICAN: 48
GLUCOSE BLD-MCNC: 95 MG/DL (ref 70–99)
HCT VFR BLD CALC: 33.7 % (ref 40.5–52.5)
HEMOGLOBIN: 11.4 G/DL (ref 13.5–17.5)
LYMPHOCYTES ABSOLUTE: 1.6 K/UL (ref 1–5.1)
LYMPHOCYTES RELATIVE PERCENT: 11 %
MCH RBC QN AUTO: 32.4 PG (ref 26–34)
MCHC RBC AUTO-ENTMCNC: 33.6 G/DL (ref 31–36)
MCV RBC AUTO: 96.3 FL (ref 80–100)
MONOCYTES ABSOLUTE: 0.6 K/UL (ref 0–1.3)
MONOCYTES RELATIVE PERCENT: 4 %
NEUTROPHILS ABSOLUTE: 12.4 K/UL (ref 1.7–7.7)
NEUTROPHILS RELATIVE PERCENT: 82 %
PDW BLD-RTO: 16.6 % (ref 12.4–15.4)
PLATELET # BLD: 305 K/UL (ref 135–450)
PLATELET SLIDE REVIEW: ADEQUATE
PMV BLD AUTO: 7.8 FL (ref 5–10.5)
POTASSIUM SERPL-SCNC: 3.3 MMOL/L (ref 3.5–5.1)
RBC # BLD: 3.51 M/UL (ref 4.2–5.9)
SLIDE REVIEW: ABNORMAL
SODIUM BLD-SCNC: 136 MMOL/L (ref 136–145)
TOXIC GRANULATION: PRESENT
WBC # BLD: 14.8 K/UL (ref 4–11)

## 2018-09-21 PROCEDURE — 83735 ASSAY OF MAGNESIUM: CPT

## 2018-09-21 PROCEDURE — 6360000002 HC RX W HCPCS: Performed by: INTERNAL MEDICINE

## 2018-09-21 PROCEDURE — 6370000000 HC RX 637 (ALT 250 FOR IP): Performed by: INTERNAL MEDICINE

## 2018-09-21 PROCEDURE — 99152 MOD SED SAME PHYS/QHP 5/>YRS: CPT | Performed by: INTERNAL MEDICINE

## 2018-09-21 PROCEDURE — 85025 COMPLETE CBC W/AUTO DIFF WBC: CPT

## 2018-09-21 PROCEDURE — 99232 SBSQ HOSP IP/OBS MODERATE 35: CPT | Performed by: INTERNAL MEDICINE

## 2018-09-21 PROCEDURE — 36415 COLL VENOUS BLD VENIPUNCTURE: CPT

## 2018-09-21 PROCEDURE — 2580000003 HC RX 258: Performed by: INTERNAL MEDICINE

## 2018-09-21 PROCEDURE — 80048 BASIC METABOLIC PNL TOTAL CA: CPT

## 2018-09-21 PROCEDURE — 7100000011 HC PHASE II RECOVERY - ADDTL 15 MIN: Performed by: INTERNAL MEDICINE

## 2018-09-21 PROCEDURE — 1200000000 HC SEMI PRIVATE

## 2018-09-21 PROCEDURE — 3609008300 HC SIGMOIDOSCOPY W/BIOPSY SINGLE/MULTIPLE: Performed by: INTERNAL MEDICINE

## 2018-09-21 PROCEDURE — 0DJD8ZZ INSPECTION OF LOWER INTESTINAL TRACT, VIA NATURAL OR ARTIFICIAL OPENING ENDOSCOPIC: ICD-10-PCS | Performed by: INTERNAL MEDICINE

## 2018-09-21 PROCEDURE — 2500000003 HC RX 250 WO HCPCS: Performed by: INTERNAL MEDICINE

## 2018-09-21 PROCEDURE — 7100000010 HC PHASE II RECOVERY - FIRST 15 MIN: Performed by: INTERNAL MEDICINE

## 2018-09-21 PROCEDURE — 2709999900 HC NON-CHARGEABLE SUPPLY: Performed by: INTERNAL MEDICINE

## 2018-09-21 PROCEDURE — 45330 DIAGNOSTIC SIGMOIDOSCOPY: CPT | Performed by: INTERNAL MEDICINE

## 2018-09-21 RX ORDER — FENTANYL CITRATE 50 UG/ML
INJECTION, SOLUTION INTRAMUSCULAR; INTRAVENOUS PRN
Status: DISCONTINUED | OUTPATIENT
Start: 2018-09-21 | End: 2018-09-21

## 2018-09-21 RX ORDER — SODIUM CHLORIDE 9 MG/ML
INJECTION, SOLUTION INTRAVENOUS CONTINUOUS
Status: DISCONTINUED | OUTPATIENT
Start: 2018-09-21 | End: 2018-09-25

## 2018-09-21 RX ORDER — MIDAZOLAM HYDROCHLORIDE 5 MG/ML
INJECTION INTRAMUSCULAR; INTRAVENOUS PRN
Status: DISCONTINUED | OUTPATIENT
Start: 2018-09-21 | End: 2018-09-21

## 2018-09-21 RX ADMIN — Medication 500 MG: at 18:20

## 2018-09-21 RX ADMIN — ALPRAZOLAM 0.5 MG: 0.5 TABLET ORAL at 21:18

## 2018-09-21 RX ADMIN — DONEPEZIL HYDROCHLORIDE 5 MG: 5 TABLET, FILM COATED ORAL at 21:18

## 2018-09-21 RX ADMIN — CYANOCOBALAMIN TAB 500 MCG 500 MCG: 500 TAB at 11:30

## 2018-09-21 RX ADMIN — POTASSIUM CHLORIDE 20 MEQ: 20 TABLET, EXTENDED RELEASE ORAL at 11:30

## 2018-09-21 RX ADMIN — METOPROLOL TARTRATE 12.5 MG: 25 TABLET ORAL at 21:18

## 2018-09-21 RX ADMIN — SODIUM CHLORIDE: 9 INJECTION, SOLUTION INTRAVENOUS at 12:15

## 2018-09-21 RX ADMIN — METRONIDAZOLE 500 MG: 500 INJECTION, SOLUTION INTRAVENOUS at 18:14

## 2018-09-21 RX ADMIN — Medication 500 MG: at 23:39

## 2018-09-21 RX ADMIN — Medication 500 MG: at 13:16

## 2018-09-21 RX ADMIN — METRONIDAZOLE 500 MG: 500 INJECTION, SOLUTION INTRAVENOUS at 23:39

## 2018-09-21 RX ADMIN — Medication 10 ML: at 21:18

## 2018-09-21 RX ADMIN — POTASSIUM CHLORIDE 20 MEQ: 20 TABLET, EXTENDED RELEASE ORAL at 21:17

## 2018-09-21 RX ADMIN — Medication 500 MG: at 05:32

## 2018-09-21 RX ADMIN — APIXABAN 2.5 MG: 5 TABLET, FILM COATED ORAL at 21:18

## 2018-09-21 ASSESSMENT — PAIN - FUNCTIONAL ASSESSMENT: PAIN_FUNCTIONAL_ASSESSMENT: 0-10

## 2018-09-21 ASSESSMENT — PAIN SCALES - GENERAL
PAINLEVEL_OUTOF10: 0

## 2018-09-21 NOTE — PROGRESS NOTES
Alert and oriented x 4. Respirations easy and even. No respiratory distress noted. Pulse regular and non-bounding. Abdomen soft and non-tender. Peripheral pulses present. AM meds caught up after endo procedure, pt started back on liquid diet, states he has still been having some diarrhea, VSS, Denies needs at this time.  Will continue to monitor

## 2018-09-21 NOTE — H&P
children: N/A    Years of education: N/A          Occupational History    Not on file.            Social History Main Topics    Smoking status: Never Smoker    Smokeless tobacco: Never Used    Alcohol use No    Drug use: No    Sexual activity: Yes       Partners: Female           Other Topics Concern    Not on file          Social History Narrative    No narrative on file         SURGICAL HISTORY      Past Surgical History         Past Surgical History:   Procedure Laterality Date    APPENDECTOMY        CARDIAC DEFIBRILLATOR PLACEMENT        CARDIAC SURGERY Left       5/13/17    CATARACT REMOVAL WITH IMPLANT Right 06/17/2017     [facility]/Dr Rosy Rutherford    CHOLECYSTECTOMY        HERNIA REPAIR Bilateral      PACEMAKER INSERTION        TONSILLECTOMY             CURRENT MEDICATIONS   (This list may include medications prescribed during this encounter as epic can not insert only the list prior to this encounter.)     ALLERGIES           Allergies   Allergen Reactions    Warfarin Itching         REVIEW OF SYSTEMS   12 point ROS done and negative except as mentioned in the HPI.     PHYSICAL EXAM   BP (!) 93/54 Comment: small cuff  Pulse 100   Temp 96.8 °F (36 °C) (Oral)   Resp 15   Ht 5' 7\" (1.702 m)   Wt 138 lb 1.6 oz (62.6 kg)   SpO2 97%   BMI 21.63 kg/m²       Wt Readings from Last 3 Encounters:   09/19/18 138 lb 1.6 oz (62.6 kg)   08/21/18 147 lb 6.4 oz (66.9 kg)   08/01/18 149 lb (67.6 kg)      Constitutional: AAO3, frail elderly man in no acute distress  HENT: no pallor or icterus. Neck: Supple, No stridor. Cardiovascular: Normal heart rate, Normal rhythm,    Thorax & Lungs: Normal breath sounds, air entry reduced both LL. Abdomen: soft, non tender, not distended, BS+. No hepatosplenomegaly.   Extremities:  1+bilateral LE edema          Recent Labs      09/18/18   1510  09/19/18   0607   WBC  26.5*  22.8*   HGB  12.8*  11.3*   MCV  96.0  96.7   PLT  314  309   INR  1.51*   --    NA  145

## 2018-09-21 NOTE — PROGRESS NOTES
Occupational Therapy/Physical Therapy    Unit: 2West   Date:  9/21/2018  Patient Name:    Claudia Pack  Admitting diagnosis:  Acute renal failure ([facility]F) ([facility] [N17.9]  Admit Date:  9/18/2018    Attempt @ 4161: Decline. Pt reports he is not ready to work with therapy at this time 2/2 flexible sigmoidoscopy earlier this AM. Pt agreeable to work with therapy tomorrow. Thank you.      Yung Merchant, MOT, OTR/L   [id number]  [facility], [address] [facility]

## 2018-09-21 NOTE — CARE COORDINATION
INTERDISCIPLINARY PLAN OF CARE CONFERENCE    Date/Time: 9/21/2018 5:14 PM  Completed by: Giancarlo England, Case Management      Patient Name:  Tejinder Mark  YOB: 1934  Admitting Diagnosis: Acute renal failure (ARF) ([address] [N17.9]     Admit Date/Time:  9/18/2018  2:19 PM    Chart reviewed. Interdisciplinary team met to discuss patient progress and discharge plans. Disciplines included Case Management, Nursing, and Dietitian. Current Status: Stable    Anticipated Discharge Date: TBD  Expected D/C Disposition:  Rehab  Confirmed plan with patient and/or family Yes  Discharge Plan Comments:  Reviewed chart and noted pt plan for STR at [facility] with Donell Crowell from [facility] who states is reviewing and will review again today. Will call back to let writer know if can accept at [facility]. Per Donell Crowell pt is notifiaction to [facility] only. Will cont to follow.     Home O2 in place on admit: No  Pt informed of need to bring portable home O2 tank on day of discharge for nursing to connect prior to leaving:  Not Indicated  Verbalized agreement/Understanding:  Not Indicated

## 2018-09-21 NOTE — PLAN OF CARE
Problem: Falls - Risk of:  Goal: Will remain free from falls  Will remain free from falls   Outcome: Ongoing  Pt uses call light for assistance, bed in lowest position, wheels locked, bedside table and call light within reach      Problem: Risk for Impaired Skin Integrity  Goal: Tissue integrity - skin and mucous membranes  Structural intactness and normal physiological function of skin and  mucous membranes. Outcome: Ongoing  Pt remains free of skin breakdown. Pt encouraged to turn frequently. Will assess skin per shift or as needed. Problem:  Bowel/Gastric:  Goal: Occurrences of diarrhea will decrease  Occurrences of diarrhea will decrease   Outcome: Ongoing

## 2018-09-22 LAB
BANDED NEUTROPHILS RELATIVE PERCENT: 4 % (ref 0–7)
BASOPHILS ABSOLUTE: 0 K/UL (ref 0–0.2)
BASOPHILS RELATIVE PERCENT: 0 %
EKG ATRIAL RATE: 0 BPM
EKG DIAGNOSIS: NORMAL
EKG Q-T INTERVAL: 468 MS
EKG QRS DURATION: 182 MS
EKG QTC CALCULATION (BAZETT): 609 MS
EKG R AXIS: -73 DEGREES
EKG T AXIS: 98 DEGREES
EKG VENTRICULAR RATE: 102 BPM
EOSINOPHILS ABSOLUTE: 0.2 K/UL (ref 0–0.6)
EOSINOPHILS RELATIVE PERCENT: 2 %
HCT VFR BLD CALC: 31.8 % (ref 40.5–52.5)
HEMOGLOBIN: 10.8 G/DL (ref 13.5–17.5)
LYMPHOCYTES ABSOLUTE: 1.1 K/UL (ref 1–5.1)
LYMPHOCYTES RELATIVE PERCENT: 9 %
MAGNESIUM: 1.8 MG/DL (ref 1.8–2.4)
MCH RBC QN AUTO: 32.1 PG (ref 26–34)
MCHC RBC AUTO-ENTMCNC: 33.8 G/DL (ref 31–36)
MCV RBC AUTO: 95.1 FL (ref 80–100)
MONOCYTES ABSOLUTE: 0.5 K/UL (ref 0–1.3)
MONOCYTES RELATIVE PERCENT: 4 %
NEUTROPHILS ABSOLUTE: 10.2 K/UL (ref 1.7–7.7)
NEUTROPHILS RELATIVE PERCENT: 81 %
PDW BLD-RTO: 16.2 % (ref 12.4–15.4)
PLATELET # BLD: 343 K/UL (ref 135–450)
PLATELET SLIDE REVIEW: ADEQUATE
PMV BLD AUTO: 7.9 FL (ref 5–10.5)
RBC # BLD: 3.35 M/UL (ref 4.2–5.9)
SLIDE REVIEW: ABNORMAL
WBC # BLD: 12 K/UL (ref 4–11)

## 2018-09-22 PROCEDURE — 1200000000 HC SEMI PRIVATE

## 2018-09-22 PROCEDURE — 6370000000 HC RX 637 (ALT 250 FOR IP): Performed by: INTERNAL MEDICINE

## 2018-09-22 PROCEDURE — 97530 THERAPEUTIC ACTIVITIES: CPT

## 2018-09-22 PROCEDURE — 93005 ELECTROCARDIOGRAM TRACING: CPT | Performed by: INTERNAL MEDICINE

## 2018-09-22 PROCEDURE — 36415 COLL VENOUS BLD VENIPUNCTURE: CPT

## 2018-09-22 PROCEDURE — 2500000003 HC RX 250 WO HCPCS: Performed by: INTERNAL MEDICINE

## 2018-09-22 PROCEDURE — 85025 COMPLETE CBC W/AUTO DIFF WBC: CPT

## 2018-09-22 PROCEDURE — 97116 GAIT TRAINING THERAPY: CPT

## 2018-09-22 PROCEDURE — 2580000003 HC RX 258: Performed by: INTERNAL MEDICINE

## 2018-09-22 PROCEDURE — 36591 DRAW BLOOD OFF VENOUS DEVICE: CPT

## 2018-09-22 PROCEDURE — 93010 ELECTROCARDIOGRAM REPORT: CPT | Performed by: INTERNAL MEDICINE

## 2018-09-22 RX ADMIN — CYANOCOBALAMIN TAB 500 MCG 500 MCG: 500 TAB at 08:24

## 2018-09-22 RX ADMIN — SODIUM CHLORIDE: 9 INJECTION, SOLUTION INTRAVENOUS at 15:06

## 2018-09-22 RX ADMIN — Medication 500 MG: at 05:39

## 2018-09-22 RX ADMIN — ALPRAZOLAM 0.5 MG: 0.5 TABLET ORAL at 21:18

## 2018-09-22 RX ADMIN — DONEPEZIL HYDROCHLORIDE 5 MG: 5 TABLET, FILM COATED ORAL at 20:44

## 2018-09-22 RX ADMIN — Medication 10 ML: at 21:02

## 2018-09-22 RX ADMIN — POTASSIUM CHLORIDE 20 MEQ: 20 TABLET, EXTENDED RELEASE ORAL at 20:44

## 2018-09-22 RX ADMIN — METRONIDAZOLE 500 MG: 500 INJECTION, SOLUTION INTRAVENOUS at 16:59

## 2018-09-22 RX ADMIN — SODIUM CHLORIDE: 9 INJECTION, SOLUTION INTRAVENOUS at 04:34

## 2018-09-22 RX ADMIN — METRONIDAZOLE 500 MG: 500 INJECTION, SOLUTION INTRAVENOUS at 08:21

## 2018-09-22 RX ADMIN — Medication 500 MG: at 17:34

## 2018-09-22 RX ADMIN — APIXABAN 2.5 MG: 5 TABLET, FILM COATED ORAL at 20:44

## 2018-09-22 RX ADMIN — POTASSIUM CHLORIDE 20 MEQ: 20 TABLET, EXTENDED RELEASE ORAL at 08:24

## 2018-09-22 RX ADMIN — Medication 500 MG: at 12:05

## 2018-09-22 RX ADMIN — APIXABAN 2.5 MG: 5 TABLET, FILM COATED ORAL at 08:23

## 2018-09-22 NOTE — PROGRESS NOTES
Shift report received from Yi, [address] [facility]. Resting quietly in bed.   Call light in reach

## 2018-09-22 NOTE — PLAN OF CARE
Problem: OXYGENATION/RESPIRATORY FUNCTION  Goal: Patient will maintain patent airway  Outcome: Ongoing      Problem: SAFETY  Goal: Free from accidental physical injury  Outcome: Ongoing      Problem: DAILY CARE  Goal: Daily care needs are met  Outcome: Ongoing      Problem:  Bowel/Gastric:  Goal: Occurrences of diarrhea will decrease  Occurrences of diarrhea will decrease   Outcome: Ongoing no loss of consciousness, no gait abnormality, no headache, no sensory deficits, and no weakness.

## 2018-09-22 NOTE — PROGRESS NOTES
Occupational Therapy Daily Treatment Note    Unit:   2West   Date:  9/22/2018  Patient Name:    Monty Crow  Admitting diagnosis:  Ac[facility] [N17.9]  Admit Date:  9/18/2018  Precautions/Restrictions:  contact-C-Diff, stage 2 coccyx, stage 1 L hip    Discharge Recommendations: SNF  AM-PAC Score: 15  DME needs at discharge:  Defer to facility     Treatment Time:  0603-2169  Treatment number: 2     Subjective:  Pt agreeable to work with therapy this date. Pt expressed want to watch football game on tv. Pt encouraged to watch from chair and pt agreeable to goal.     Pain:  Pt with c/o pain in back initially. Pt reported improvement upon OOB activity. Bed Mobility:   Supine to Sit: SBA with HOB elevated  Sit to Supine: DNT, pt up in chair   Scooting it sitting: SBA     Balance:   Static sitting: Good  Dynamic sitting: Good  Static standing: Good  Dynamic standing: Good    Transfer Training:   Sit to stand: CGA with RW from EOB   Stand to sit: CGA with RW to chair  Bed to Chair: CGA with RW via stand step transfer     ADL Training: Declined this date. Reports needs met at this time. LB Dressing: MaxA to don socks. Pt attempted initially. Activity Tolerance:   Initial: 126/75 and 117/75     Patient Education: Role of OT, POC     Positioning Needs: Pt in bed side chair with needs in place and alarm activated. Family Present: None    Assessment: Pt with great progress this date. Pt. Limited during treatment session by fatigue. Pt will benefit from skilled OT while in the hospital and upon d/c in order to facilitate progress toward safe and more indpt levels of functioning. At end of treatment, pt. In bed side chair, alarm activated with call light and needs within reach. RN notified. Goals: To be met in 3 Visits:  1). Indep with UE ex x 10 reps     To be met in 5 Visits:  1). Supine to Sit MIN A  Goal met and updated --> Indpt  2).  Bed to Chair/BSC MIN A  Goal met and updated

## 2018-09-22 NOTE — PROGRESS NOTES
Patient is laying left side. RR e/e. No s/s of distress at this time. Call light and bedside table within reach. Will continue to monitor.

## 2018-09-22 NOTE — PROGRESS NOTES
chair: min A : goal met,upgrade to SBA  4). Gait x 25' with RW and min A : continue goal  5). Tolerate B LE exercises 10 reps : continue goal    Plan   Continue with plan of care. Patient to remain in chair for about 1 hour until after dinner. At end of treatment, patient in chair   with call light and needs within reach. Time Coded Treatment Minutes: 32  minutes    Total Treatment Time:   30HVYNITA    Signature, License #  Marvin Matos, PT #[id number]    If patient discharges from this facility prior to next visit, this note will serve as the Discharge Summary.

## 2018-09-22 NOTE — PROGRESS NOTES
PCU called with a rhythm change on monitor. Pt alert and oriented X4 denies pain or dizziness. BP 90/64. . Clinical, charge and MD notified. New orders for EKG STAT. Radiology at the bedside. Electrodes checked and changed at this time. Will monitor.

## 2018-09-23 LAB
ALBUMIN SERPL-MCNC: 1.8 G/DL (ref 3.4–5)
ANION GAP SERPL CALCULATED.3IONS-SCNC: 7 MMOL/L (ref 3–16)
BANDED NEUTROPHILS RELATIVE PERCENT: 4 % (ref 0–7)
BASOPHILS ABSOLUTE: 0.1 K/UL (ref 0–0.2)
BASOPHILS RELATIVE PERCENT: 1 %
BUN BLDV-MCNC: 21 MG/DL (ref 7–20)
CALCIUM SERPL-MCNC: 6.7 MG/DL (ref 8.3–10.6)
CHLORIDE BLD-SCNC: 111 MMOL/L (ref 99–110)
CO2: 21 MMOL/L (ref 21–32)
CREAT SERPL-MCNC: 1.3 MG/DL (ref 0.8–1.3)
EOSINOPHILS ABSOLUTE: 0 K/UL (ref 0–0.6)
EOSINOPHILS RELATIVE PERCENT: 0 %
GFR AFRICAN AMERICAN: >60
GFR NON-AFRICAN AMERICAN: 53
GLUCOSE BLD-MCNC: 103 MG/DL (ref 70–99)
HCT VFR BLD CALC: 29.3 % (ref 40.5–52.5)
HEMOGLOBIN: 9.9 G/DL (ref 13.5–17.5)
LYMPHOCYTES ABSOLUTE: 1.5 K/UL (ref 1–5.1)
LYMPHOCYTES RELATIVE PERCENT: 12 %
MAGNESIUM: 1.7 MG/DL (ref 1.8–2.4)
MCH RBC QN AUTO: 32.5 PG (ref 26–34)
MCHC RBC AUTO-ENTMCNC: 33.9 G/DL (ref 31–36)
MCV RBC AUTO: 96 FL (ref 80–100)
MONOCYTES ABSOLUTE: 0.1 K/UL (ref 0–1.3)
MONOCYTES RELATIVE PERCENT: 1 %
NEUTROPHILS ABSOLUTE: 10.8 K/UL (ref 1.7–7.7)
NEUTROPHILS RELATIVE PERCENT: 82 %
PDW BLD-RTO: 16.4 % (ref 12.4–15.4)
PHOSPHORUS: 2.1 MG/DL (ref 2.5–4.9)
PLATELET # BLD: 318 K/UL (ref 135–450)
PLATELET SLIDE REVIEW: ADEQUATE
PMV BLD AUTO: 7.2 FL (ref 5–10.5)
POTASSIUM SERPL-SCNC: 3.9 MMOL/L (ref 3.5–5.1)
RBC # BLD: 3.05 M/UL (ref 4.2–5.9)
SLIDE REVIEW: ABNORMAL
SODIUM BLD-SCNC: 139 MMOL/L (ref 136–145)
WBC # BLD: 12.6 K/UL (ref 4–11)

## 2018-09-23 PROCEDURE — 6360000002 HC RX W HCPCS: Performed by: INTERNAL MEDICINE

## 2018-09-23 PROCEDURE — 6370000000 HC RX 637 (ALT 250 FOR IP): Performed by: INTERNAL MEDICINE

## 2018-09-23 PROCEDURE — 2580000003 HC RX 258: Performed by: INTERNAL MEDICINE

## 2018-09-23 PROCEDURE — 2500000003 HC RX 250 WO HCPCS: Performed by: INTERNAL MEDICINE

## 2018-09-23 PROCEDURE — 85025 COMPLETE CBC W/AUTO DIFF WBC: CPT

## 2018-09-23 PROCEDURE — 1200000000 HC SEMI PRIVATE

## 2018-09-23 PROCEDURE — 80069 RENAL FUNCTION PANEL: CPT

## 2018-09-23 PROCEDURE — 83735 ASSAY OF MAGNESIUM: CPT

## 2018-09-23 RX ORDER — MAGNESIUM SULFATE IN WATER 40 MG/ML
4 INJECTION, SOLUTION INTRAVENOUS ONCE
Status: COMPLETED | OUTPATIENT
Start: 2018-09-23 | End: 2018-09-23

## 2018-09-23 RX ADMIN — ALPRAZOLAM 0.5 MG: 0.5 TABLET ORAL at 20:24

## 2018-09-23 RX ADMIN — Medication 500 MG: at 07:02

## 2018-09-23 RX ADMIN — APIXABAN 2.5 MG: 5 TABLET, FILM COATED ORAL at 08:39

## 2018-09-23 RX ADMIN — Medication 500 MG: at 11:19

## 2018-09-23 RX ADMIN — METRONIDAZOLE 500 MG: 500 INJECTION, SOLUTION INTRAVENOUS at 07:23

## 2018-09-23 RX ADMIN — CYANOCOBALAMIN TAB 500 MCG 500 MCG: 500 TAB at 08:39

## 2018-09-23 RX ADMIN — POTASSIUM CHLORIDE 20 MEQ: 20 TABLET, EXTENDED RELEASE ORAL at 20:22

## 2018-09-23 RX ADMIN — Medication 500 MG: at 00:53

## 2018-09-23 RX ADMIN — SODIUM CHLORIDE: 9 INJECTION, SOLUTION INTRAVENOUS at 15:13

## 2018-09-23 RX ADMIN — Medication 500 MG: at 17:35

## 2018-09-23 RX ADMIN — METRONIDAZOLE 500 MG: 500 INJECTION, SOLUTION INTRAVENOUS at 15:13

## 2018-09-23 RX ADMIN — APIXABAN 2.5 MG: 5 TABLET, FILM COATED ORAL at 20:23

## 2018-09-23 RX ADMIN — DONEPEZIL HYDROCHLORIDE 5 MG: 5 TABLET, FILM COATED ORAL at 20:22

## 2018-09-23 RX ADMIN — SODIUM CHLORIDE: 9 INJECTION, SOLUTION INTRAVENOUS at 20:20

## 2018-09-23 RX ADMIN — POTASSIUM CHLORIDE 20 MEQ: 20 TABLET, EXTENDED RELEASE ORAL at 08:39

## 2018-09-23 RX ADMIN — METRONIDAZOLE 500 MG: 500 INJECTION, SOLUTION INTRAVENOUS at 00:53

## 2018-09-23 RX ADMIN — MAGNESIUM SULFATE IN WATER 4 G: 40 INJECTION, SOLUTION INTRAVENOUS at 16:16

## 2018-09-23 NOTE — PLAN OF CARE
Problem: Falls - Risk of:  Goal: Will remain free from falls  Will remain free from falls   Outcome: Ongoing    Goal: Absence of physical injury  Absence of physical injury   Outcome: Ongoing      Problem: Risk for Impaired Skin Integrity  Goal: Tissue integrity - skin and mucous membranes  Structural intactness and normal physiological function of skin and  mucous membranes. Outcome: Ongoing      Problem: OXYGENATION/RESPIRATORY FUNCTION  Goal: Patient will achieve/maintain normal respiratory rate/effort  Respiratory rate and effort will be within normal limits for the patient     Intervention: ASSESS RESPIRATORY RATE, EFFORT AND BREATHING PATTERN  Respirations 18, even, unlabored      Problem: SAFETY  Goal: Free from accidental physical injury  Outcome: Ongoing    Goal: Free from intentional harm  Outcome: Ongoing      Problem: DAILY CARE  Goal: Daily care needs are met  Outcome: Ongoing      Problem: DISCHARGE BARRIERS  Goal: Patient's continuum of care needs are met  Outcome: Ongoing      Problem:  Bowel/Gastric:  Goal: Occurrences of diarrhea will decrease  Occurrences of diarrhea will decrease   Outcome: Ongoing      Problem: Physical Regulation:  Goal: Prevent transmision of infection  Prevent transmision of infection   Outcome: Ongoing    Goal: Ability to avoid or minimize complications of infection will improve  Ability to avoid or minimize complications of infection will improve   Outcome: Ongoing    Goal: Signs and symptoms of infection will decrease  Signs and symptoms of infection will decrease   Outcome: Ongoing      Problem: Nutrition  Goal: Optimal nutrition therapy  Outcome: Ongoing      Problem: Infection - Central Venous Catheter-Associated Bloodstream Infection:  Goal: Will show no infection signs and symptoms  Will show no infection signs and symptoms   Outcome: Ongoing

## 2018-09-23 NOTE — PROGRESS NOTES
AM assessment complete. Gave am meds due see mar. A/O x 4. Eating breakfast. Denies any pain/needs. Call light within reach.

## 2018-09-23 NOTE — PROGRESS NOTES
Progress Note    Admit Date:  9/18/2018  Central line placed  BP stable   Acidosis resolved    Subjective:  Mr. Carlos Zabala reports ongoing diarrhea - slowed. Denies abd pain, no nausea. Appetite better - doing ok with solid food. Objective:   Patient Vitals for the past 4 hrs:   BP Temp Temp src Pulse Resp SpO2   09/23/18 1151 99/63 96.9 °F (36.1 °C) Oral 70 17 94 %            Intake/Output Summary (Last 24 hours) at 09/23/18 1540  Last data filed at 09/23/18 1518   Gross per 24 hour   Intake             2198 ml   Output                0 ml   Net             2198 ml       Physical Exam:        General:  Elderly male, sick appearing  Awake, alert and oriented. Appears to be not in any distress  Mucous Membranes:  Pink , anicteric  Neck: No JVD, no carotid bruit, no thyromegaly  Chest:  Clear to auscultation bilaterally, no added sounds  Cardiovascular:  RRR S1S2 heard, no murmurs or gallops  Abdomen:  Soft, undistended, non tender, no organomegaly, BS present  Extremities: No edema or cyanosis.  Distal pulses well felt  Neurological : grossly normal with gen weakness               Scheduled Meds:   metoprolol tartrate  12.5 mg Oral BID    metroNIDAZOLE  500 mg Intravenous Q8H    potassium chloride  20 mEq Oral BID    apixaban  2.5 mg Oral BID    vancomycin  500 mg Oral 4 times per day    vitamin B-12  500 mcg Oral Daily    donepezil  5 mg Oral Nightly    amiodarone  200 mg Oral Daily    sodium chloride flush  10 mL Intravenous 2 times per day       Continuous Infusions:   sodium chloride 75 mL/hr at 09/23/18 1513       PRN Meds:  prochlorperazine, acetaminophen, ALPRAZolam, sodium chloride flush      Data:  CBC:   Recent Labs      09/21/18   0549  09/22/18   0540  09/23/18   0500   WBC  14.8*  12.0*  12.6*   HGB  11.4*  10.8*  9.9*   HCT  33.7*  31.8*  29.3*   MCV  96.3  95.1  96.0   PLT  305  343  318     BMP:   Recent Labs      09/21/18   0549  09/23/18   0500   NA  136  139   K  3.3*  3.9   CL 104  111*   CO2  23  21   PHOS   --   2.1*   BUN  26*  21*   CREATININE  1.4*  1.3     LIVER PROFILE:   No results for input(s): AST, ALT, LIPASE, BILIDIR, BILITOT, ALKPHOS in the last 72 hours. Invalid input(s): AMYLASE,  ALB  PT/INR:   No results for input(s): PROTIME, INR in the last 72 hours. CULTURES    C diff: positive    GI bacterial pathogens: negative     RADIOLOGY    CT ABDOMEN PELVIS WO CONTRAST Additional Contrast? None 9/19/2018   Final Result   Nonspecific colitis, pseudomembranous colitis is considered         XR CHEST STANDARD (2 VW) 9/18/2018   Final Result   Irregular linear opacities are seen within both lungs which may be related to   chronic fibrosis/interstitial disease with mild superimposed airspace disease   not excluded. Assessment/Plan:    Acute on Recurrent C diff colitis  - previously completed 2 courses of Dificid  - Admit to Med Surg  - + Cdiff  - positive occult blood stool due to colitis. - Sigmoidoscopy with pseudomembranous colitis findings. - Cont PO Vanc and IV Flagyl.   - advance diet   - may be a candidate for long term vanc taper - consider ID eval Monday. Hypokalemia  - 2.7  - improved with supplement   - repeat K better      Hypomagnesemia -   4g Mg IV today. NAGMA - sec to severe diarrhea - resolved      MORGAN on CKD stage IV  - baseline Cr ~1.7-2  - Cr on admission: 2.4, improving to 1.3      Leukocytosis  - 26.5  - 2/2 above  - repeat 14 to 12      Atrial Fibrillation  - AC: Eliquis --> dose decreased to 2.5 mg, held for planned scope - resumed 9/21  - cont amiodarone, Lopressor  - tele monitor        Poor prognosis. Doing better clinically. Wants to return home on discharge. Cont PT efforts.      DVT Prophylaxis: Eliquis  Diet: DIET GENERAL;  Code Status: Full Code        Myke Holcomb MD 9/23/2018 3:40 PM

## 2018-09-23 NOTE — PROGRESS NOTES
Physical Therapy  Attempting PT follow up visit at this time, but pt currently receiving nursing care for incontinence. Will plan to follow up with pt later this date as schedules allow. No charge.      Shala Yates, PT, DPT #[id number]

## 2018-09-24 LAB
ALBUMIN SERPL-MCNC: 2 G/DL (ref 3.4–5)
ANION GAP SERPL CALCULATED.3IONS-SCNC: 8 MMOL/L (ref 3–16)
ANISOCYTOSIS: ABNORMAL
ATYPICAL LYMPHOCYTE RELATIVE PERCENT: 4 % (ref 0–6)
BASOPHILS ABSOLUTE: 0 K/UL (ref 0–0.2)
BASOPHILS RELATIVE PERCENT: 0 %
BUN BLDV-MCNC: 17 MG/DL (ref 7–20)
CALCIUM SERPL-MCNC: 7.2 MG/DL (ref 8.3–10.6)
CHLORIDE BLD-SCNC: 113 MMOL/L (ref 99–110)
CO2: 19 MMOL/L (ref 21–32)
CREAT SERPL-MCNC: 1.2 MG/DL (ref 0.8–1.3)
EOSINOPHILS ABSOLUTE: 0.4 K/UL (ref 0–0.6)
EOSINOPHILS RELATIVE PERCENT: 3 %
GFR AFRICAN AMERICAN: >60
GFR NON-AFRICAN AMERICAN: 58
GLUCOSE BLD-MCNC: 96 MG/DL (ref 70–99)
HCT VFR BLD CALC: 33.5 % (ref 40.5–52.5)
HEMOGLOBIN: 11.2 G/DL (ref 13.5–17.5)
LYMPHOCYTES ABSOLUTE: 2.7 K/UL (ref 1–5.1)
LYMPHOCYTES RELATIVE PERCENT: 18 %
MCH RBC QN AUTO: 32.3 PG (ref 26–34)
MCHC RBC AUTO-ENTMCNC: 33.3 G/DL (ref 31–36)
MCV RBC AUTO: 96.9 FL (ref 80–100)
MONOCYTES ABSOLUTE: 0.6 K/UL (ref 0–1.3)
MONOCYTES RELATIVE PERCENT: 5 %
MYELOCYTE PERCENT: 1 %
NEUTROPHILS ABSOLUTE: 8.6 K/UL (ref 1.7–7.7)
NEUTROPHILS RELATIVE PERCENT: 69 %
PDW BLD-RTO: 16.6 % (ref 12.4–15.4)
PHOSPHORUS: 2.3 MG/DL (ref 2.5–4.9)
PLATELET # BLD: 356 K/UL (ref 135–450)
PLATELET SLIDE REVIEW: ADEQUATE
PMV BLD AUTO: 7.1 FL (ref 5–10.5)
POTASSIUM SERPL-SCNC: 4 MMOL/L (ref 3.5–5.1)
RBC # BLD: 3.46 M/UL (ref 4.2–5.9)
SLIDE REVIEW: ABNORMAL
SODIUM BLD-SCNC: 140 MMOL/L (ref 136–145)
TOXIC GRANULATION: PRESENT
WBC # BLD: 12.3 K/UL (ref 4–11)

## 2018-09-24 PROCEDURE — 2500000003 HC RX 250 WO HCPCS: Performed by: INTERNAL MEDICINE

## 2018-09-24 PROCEDURE — 85025 COMPLETE CBC W/AUTO DIFF WBC: CPT

## 2018-09-24 PROCEDURE — 2580000003 HC RX 258: Performed by: INTERNAL MEDICINE

## 2018-09-24 PROCEDURE — 99232 SBSQ HOSP IP/OBS MODERATE 35: CPT | Performed by: INTERNAL MEDICINE

## 2018-09-24 PROCEDURE — 97110 THERAPEUTIC EXERCISES: CPT

## 2018-09-24 PROCEDURE — 97530 THERAPEUTIC ACTIVITIES: CPT

## 2018-09-24 PROCEDURE — 1200000000 HC SEMI PRIVATE

## 2018-09-24 PROCEDURE — 6370000000 HC RX 637 (ALT 250 FOR IP): Performed by: INTERNAL MEDICINE

## 2018-09-24 PROCEDURE — 97535 SELF CARE MNGMENT TRAINING: CPT

## 2018-09-24 PROCEDURE — 97116 GAIT TRAINING THERAPY: CPT

## 2018-09-24 PROCEDURE — 80069 RENAL FUNCTION PANEL: CPT

## 2018-09-24 RX ORDER — VANCOMYCIN HYDROCHLORIDE 250 MG/1
CAPSULE ORAL
Qty: 40 CAPSULE | Refills: 0
Start: 2018-09-24 | End: 2018-09-26

## 2018-09-24 RX ORDER — ALPRAZOLAM 0.5 MG/1
0.5 TABLET ORAL 2 TIMES DAILY PRN
Qty: 4 TABLET | Refills: 0 | Status: SHIPPED | OUTPATIENT
Start: 2018-09-24 | End: 2018-09-26

## 2018-09-24 RX ADMIN — SODIUM CHLORIDE: 9 INJECTION, SOLUTION INTRAVENOUS at 21:13

## 2018-09-24 RX ADMIN — APIXABAN 2.5 MG: 5 TABLET, FILM COATED ORAL at 09:07

## 2018-09-24 RX ADMIN — APIXABAN 2.5 MG: 5 TABLET, FILM COATED ORAL at 21:18

## 2018-09-24 RX ADMIN — POTASSIUM CHLORIDE 20 MEQ: 20 TABLET, EXTENDED RELEASE ORAL at 09:07

## 2018-09-24 RX ADMIN — METRONIDAZOLE 500 MG: 500 INJECTION, SOLUTION INTRAVENOUS at 00:43

## 2018-09-24 RX ADMIN — Medication 500 MG: at 06:39

## 2018-09-24 RX ADMIN — Medication 500 MG: at 12:13

## 2018-09-24 RX ADMIN — METRONIDAZOLE 500 MG: 500 INJECTION, SOLUTION INTRAVENOUS at 06:39

## 2018-09-24 RX ADMIN — ALPRAZOLAM 0.5 MG: 0.5 TABLET ORAL at 21:18

## 2018-09-24 RX ADMIN — Medication 500 MG: at 18:39

## 2018-09-24 RX ADMIN — Medication 500 MG: at 00:49

## 2018-09-24 RX ADMIN — CYANOCOBALAMIN TAB 500 MCG 500 MCG: 500 TAB at 09:07

## 2018-09-24 RX ADMIN — FIDAXOMICIN 200 MG: 200 TABLET, FILM COATED ORAL at 21:24

## 2018-09-24 RX ADMIN — METRONIDAZOLE 500 MG: 500 INJECTION, SOLUTION INTRAVENOUS at 16:58

## 2018-09-24 RX ADMIN — AMIODARONE HYDROCHLORIDE 200 MG: 200 TABLET ORAL at 09:07

## 2018-09-24 RX ADMIN — DONEPEZIL HYDROCHLORIDE 5 MG: 5 TABLET, FILM COATED ORAL at 21:24

## 2018-09-24 RX ADMIN — POTASSIUM CHLORIDE 20 MEQ: 20 TABLET, EXTENDED RELEASE ORAL at 21:23

## 2018-09-24 NOTE — PROGRESS NOTES
[facility]    [address] ,  [address] [place] [address]  Phone: [phone]       HPI: Kristel Pino is a(n)84 y.o. male admitted for work-up and treatment for   Acute renal failure (ARF) ([facility] [N17.9]. We are following for recurrent c diff. Still having stool run out of him shortly after eating or even just sitting there. Was scheduled to be transferred to rehab today. Javid did help with with both previous courses.  metoprolol tartrate  12.5 mg Oral BID    metroNIDAZOLE  500 mg Intravenous Q8H    potassium chloride  20 mEq Oral BID    apixaban  2.5 mg Oral BID    vancomycin  500 mg Oral 4 times per day    vitamin B-12  500 mcg Oral Daily    donepezil  5 mg Oral Nightly    amiodarone  200 mg Oral Daily    sodium chloride flush  10 mL Intravenous 2 times per day      sodium chloride 75 mL/hr at 09/23/18 2020     prochlorperazine, acetaminophen, ALPRAZolam, sodium chloride flush  I/O last 3 completed shifts:   In: 6288 [P.O.:460; I.V.:3864]  Out: -       /72   Pulse 104   Temp 96.8 °F (36 °C) (Axillary)   Resp 17   Ht 5' 7\" (1.702 m)   Wt 153 lb 14.4 oz (69.8 kg)   SpO2 97%   BMI 24.10 kg/m²     Physical Exam:  HEENT: anicteric sclera, oropharyngeal membranes pink and moist.  Cor: Heart normal S1 and S2  Lungs: CTA  Abdomen: soft, positive bowel sounds, no hepatomegaly or splenomegaly, non distended, non-tender  Extremities: no edema  Neuro: alert and oriented x 3, NAD    Additional Results:    Lab Results   Component Value Date    ALT 6 (L) 09/20/2018    AST 16 09/20/2018    ALKPHOS 48 09/20/2018    PROT 4.3 (L) 09/20/2018    LABALBU 2.0 (L) 09/24/2018    INR 1.51 (H) 09/18/2018    AMYLASE 85 08/23/2016    LIPASE 156 06/18/2018     Lab Results   Component Value Date    WBC 12.3 (H) 09/24/2018    HGB 11.2 (L) 09/24/2018    HCT 33.5 (L) 09/24/2018    MCV 96.9 09/24/2018     09/24/2018     Lab Results   Component Value Date  09/24/2018    K 4.0 09/24/2018    K 2.7 09/19/2018     09/24/2018    CO2 19 09/24/2018    BUN 17 09/24/2018     Lab Results   Component Value Date    CREATININE 1.2 09/24/2018     [unfilled]  A/P  1. Recurrent c diff. Recommend Dificid in addition to the vancomycin. I believe he is not ready for discharge and is high risk for readmission. He can not really have meaningful PT if he is going to stool on himself. Plan for ultimate colonoscopy with fecal transplant in the next 2 weeks.     Patient Active Problem List   Diagnosis Code    Coumadin toxicity T45.511A    Atrial fibrillation ([facility]) I48.91    Congestive heart failure (CHF) ([facility]) I50.9    Chronic kidney disease (CKD), stage IV (severe) ([facility]) N18.4    Gout M10.9    Chronic right-sided low back pain without sciatica M54.5, G89.29    Watts's esophagus without dysplasia K22.70    Cardiomyopathy ([facility]) I42.9    Anxiety F41.9    Colitis due to Clostridium difficile A04.72    Diarrhea R19.7    Acute renal failure (ARF) ([facility]) N17.9    Hx of Clostridium difficile infection Z86.19    FTT (failure to thrive) in adult R62.7    Moderate protein-calorie malnutrition (HCC) E44.0    Clostridium difficile colitis N23.67    Metabolic acidosis X14.4             JOSEPH ADAMSON 9/24/18 3:12 PM

## 2018-09-24 NOTE — PROGRESS NOTES
Pt resting in bed w eyes closed, no signs of distress noted. Call light within reach. Will cont to monitor.

## 2018-09-24 NOTE — PROGRESS NOTES
PM assessment completed. See flow sheet. Pt A&O X 4. Resp E&E with no distress noted. PRN Xanax given per request. SEE MAR. Pt.denies any further needs at this time. Call light within reach. Will continue to monitor.

## 2018-09-24 NOTE — DISCHARGE SUMMARY
pseudomembranous colitis throughout colon with confluent pseudomembranes and nitza of severe inflammation, friability and ulceration. Consults    Gastroenterology    Physical Exam at Discharge:    BP 93/69   Pulse 104   Temp 97 °F (36.1 °C) (Oral)   Resp 16   Ht 5' 7\" (1.702 m)   Wt 153 lb 14.4 oz (69.8 kg)   SpO2 98%   BMI 24.10 kg/m²   General:  Elderly male, sick appearing  Awake, alert and oriented. Appears to be not in any distress  Mucous Membranes:  Pink , anicteric  Neck: No JVD, no carotid bruit, no thyromegaly  Chest:  Clear to auscultation bilaterally, no added sounds  Cardiovascular:  RRR S1S2 heard, no murmurs or gallops  Abdomen:  Soft, undistended, non tender, no organomegaly, BS present  Extremities: No edema or cyanosis. Distal pulses well felt  Neurological : grossly normal with gen weakness     CBC:   Recent Labs      09/22/18   0540  09/23/18   0500  09/24/18   0642   WBC  12.0*  12.6*  12.3*   HGB  10.8*  9.9*  11.2*   HCT  31.8*  29.3*  33.5*   MCV  95.1  96.0  96.9   PLT  343  318  356     BMP:   Recent Labs      09/23/18   0500  09/24/18   0642   NA  139  140   K  3.9  4.0   CL  111*  113*   CO2  21  19*   PHOS  2.1*  2.3*   BUN  21*  17   CREATININE  1.3  1.2     CULTURES    GI Bacterial Pathogens: Negative    C diff toxin: positive    RADIOLOGY    IR FLUORO GUIDED NEEDLE PLACEMENT 9/20/2018   Final Result   Successful placement of 6 [place] JACC in the right internal jugular vein. CT ABDOMEN PELVIS WO CONTRAST Additional Contrast? None 9/19/2018   Final Result   Nonspecific colitis, pseudomembranous colitis is considered         XR CHEST STANDARD (2 VW) 9/18/2018   Final Result   Irregular linear opacities are seen within both lungs which may be related to   chronic fibrosis/interstitial disease with mild superimposed airspace disease   not excluded.            Discharge Medications     Medication List      START taking these medications    vancomycin 250 MG capsule  Commonly known as:  VANCOCIN HCL  1 q 6- 10 days, 1 bid- 7 days, 1/2 bid- 1 week        CHANGE how you take these medications    apixaban 2.5 MG Tabs tablet  Commonly known as:  ELIQUIS  Take 1 tablet by mouth 2 times daily  What changed:  · medication strength  · how much to take     mirtazapine 30 MG tablet  Commonly known as:  REMERON  What changed:  Another medication with the same name was removed. Continue taking this medication, and follow the directions you see here. potassium chloride 10 MEQ extended release tablet  Commonly known as:  KLOR-CON  Take 1 tablet by mouth 3 times daily  What changed:  when to take this        CONTINUE taking these medications    acetaminophen 325 MG tablet  Commonly known as:  TYLENOL     ALPRAZolam 0.5 MG tablet  Commonly known as:  XANAX  Take 1 tablet by mouth 2 times daily as needed for Sleep or Anxiety for up to 2 days. Adilene Black amiodarone 200 MG tablet  Commonly known as:  CORDARONE  TAKE 1/2 TABLET BY MOUTH EVERY DAY FOR HYPERTENSION     ammonium lactate 12 % lotion  Commonly known as:  LAC-HYDRIN  Apply topically daily.      aspirin 81 MG tablet     bimatoprost 0.01 % Soln ophthalmic drops  Commonly known as:  LUMIGAN     bisacodyl 5 MG EC tablet  Commonly known as:  DULCOLAX  Use as directed per Colonoscopy prep     diphenoxylate-atropine 2.5-0.025 MG per tablet  Commonly known as:  LOMOTIL     donepezil 5 MG tablet  Commonly known as:  ARICEPT  Take 1 tablet by mouth nightly     Fidaxomicin 200 MG Tabs tablet  Commonly known as:  DIFICID  Take 200 mg by mouth 2 times daily for 8 days     furosemide 20 MG tablet  Commonly known as:  LASIX  TAKE 1 TABLET BY MOUTH DAILY     GNP MENS MULTIPLUS PO     metoprolol tartrate 25 MG tablet  Commonly known as:  LOPRESSOR  TAKE 1 TABLET BY MOUTH 2 TIMES DAILY     omeprazole 20 MG delayed release capsule  Commonly known as:  PRILOSEC  TAKE 1 CAPSULE BY MOUTH DAILY     PROBIOTIC ADVANCED PO     vitamin B-12 500 MCG tablet  Commonly known as:

## 2018-09-24 NOTE — PROGRESS NOTES
Physical Therapy  Attempting PT follow up visit this AM, but pt declined as he has not gotten much sleep. Pt listed many reasons why he was unable to sleep over night and states he would like to rest now. Educated pt on the importance of getting OOB routinely with emphasis on meal time. Pt agreeable to getting OOB with assist from staff if PT unable to return this date. Will plan to re-attempt later this date as schedules allow. No charge.      Teagan De La Vega, PT, DPT #[id number]

## 2018-09-24 NOTE — PROGRESS NOTES
Progress Note    Admit Date:  9/18/2018  Central line placed  BP stable   Acidosis resolved    Subjective:  Mr. Eduar Malave reports ongoing diarrhea - slowed. Denies abd pain, no nausea. Appetite better - doing ok with solid food. Objective:   Patient Vitals for the past 4 hrs:   BP Temp Temp src Pulse Resp SpO2   09/24/18 0905 93/69 97 °F (36.1 °C) Oral 104 16 98 %   09/24/18 0746 94/72 97.2 °F (36.2 °C) Axillary 112 17 93 %            Intake/Output Summary (Last 24 hours) at 09/24/18 1102  Last data filed at 09/23/18 2021   Gross per 24 hour   Intake             4084 ml   Output                0 ml   Net             4084 ml       Physical Exam:        General:  Elderly male, sick appearing  Awake, alert and oriented. Appears to be not in any distress  Mucous Membranes:  Pink , anicteric  Neck: No JVD, no carotid bruit, no thyromegaly  Chest:  Clear to auscultation bilaterally, no added sounds  Cardiovascular:  RRR S1S2 heard, no murmurs or gallops  Abdomen:  Soft, undistended, non tender, no organomegaly, BS present  Extremities: No edema or cyanosis.  Distal pulses well felt  Neurological : grossly normal with gen weakness               Scheduled Meds:   metoprolol tartrate  12.5 mg Oral BID    metroNIDAZOLE  500 mg Intravenous Q8H    potassium chloride  20 mEq Oral BID    apixaban  2.5 mg Oral BID    vancomycin  500 mg Oral 4 times per day    vitamin B-12  500 mcg Oral Daily    donepezil  5 mg Oral Nightly    amiodarone  200 mg Oral Daily    sodium chloride flush  10 mL Intravenous 2 times per day       Continuous Infusions:   sodium chloride 75 mL/hr at 09/23/18 2020       PRN Meds:  prochlorperazine, acetaminophen, ALPRAZolam, sodium chloride flush      Data:  CBC:   Recent Labs      09/22/18   0540  09/23/18   0500  09/24/18   0642   WBC  12.0*  12.6*  12.3*   HGB  10.8*  9.9*  11.2*   HCT  31.8*  29.3*  33.5*   MCV  95.1  96.0  96.9   PLT  343  318  356     BMP:   Recent Labs 09/23/18   0500  09/24/18   0642   NA  139  140   K  3.9  4.0   CL  111*  113*   CO2  21  19*   PHOS  2.1*  2.3*   BUN  21*  17   CREATININE  1.3  1.2     LIVER PROFILE:   No results for input(s): AST, ALT, LIPASE, BILIDIR, BILITOT, ALKPHOS in the last 72 hours. Invalid input(s): AMYLASE,  ALB  PT/INR:   No results for input(s): PROTIME, INR in the last 72 hours. CULTURES    C diff: positive    GI bacterial pathogens: negative     RADIOLOGY    CT ABDOMEN PELVIS WO CONTRAST Additional Contrast? None 9/19/2018   Final Result   Nonspecific colitis, pseudomembranous colitis is considered         XR CHEST STANDARD (2 VW) 9/18/2018   Final Result   Irregular linear opacities are seen within both lungs which may be related to   chronic fibrosis/interstitial disease with mild superimposed airspace disease   not excluded. Assessment/Plan:    Acute on Recurrent C diff colitis  - previously completed 2 courses of Dificid  - Admit to Med Surg  - + Cdiff  - positive occult blood stool due to colitis. - Sigmoidoscopy with pseudomembranous colitis findings. - Cont PO Vanc and IV Flagyl.   - advance diet   -  candidate for long term vanc taper     Hypokalemia  - 2.7  - improved with supplement   - repeat K better    Hypomagnesemia -   4g Mg IV today. NAGMA - sec to severe diarrhea - resolved    MORGAN on CKD stage IV  - baseline Cr ~1.7-2  - Cr on admission: 2.4, improving to 1.3    Leukocytosis  - 26.5  - 2/2 above  - repeat 14 to 12    Atrial Fibrillation  - AC: Eliquis --> dose decreased to 2.5 mg, held for planned scope - resumed 9/21  - cont amiodarone, Lopressor  - tele monitor    Poor prognosis. Doing better clinically.      DVT Prophylaxis: Eliquis  Diet: DIET GENERAL;  Code Status: Full Code    D/c to [facility]    Miya Huynh MD 9/24/2018 11:02 AM

## 2018-09-24 NOTE — PLAN OF CARE
Problem: Falls - Risk of:  Goal: Will remain free from falls  Will remain free from falls   Outcome: Ongoing    Goal: Absence of physical injury  Absence of physical injury   Outcome: Ongoing      Problem: Risk for Impaired Skin Integrity  Goal: Tissue integrity - skin and mucous membranes  Structural intactness and normal physiological function of skin and  mucous membranes. Outcome: Ongoing      Problem: OXYGENATION/RESPIRATORY FUNCTION  Goal: Patient will maintain patent airway  Outcome: Ongoing    Goal: Patient will achieve/maintain normal respiratory rate/effort  Respiratory rate and effort will be within normal limits for the patient   Outcome: Ongoing      Problem:  Bowel/Gastric:  Goal: Occurrences of diarrhea will decrease  Occurrences of diarrhea will decrease   Outcome: Ongoing

## 2018-09-24 NOTE — PROGRESS NOTES
Called Dr. Benjamin Dumont office for clarification regarding note, office staff stated he did not want patient discharged at this time. Perfect serves sent to Dr. Ale Medina and went unanswered. Transport cancelled at this time.

## 2018-09-25 LAB
ALBUMIN SERPL-MCNC: 1.8 G/DL (ref 3.4–5)
ANION GAP SERPL CALCULATED.3IONS-SCNC: 8 MMOL/L (ref 3–16)
ATYPICAL LYMPHOCYTE RELATIVE PERCENT: 2 % (ref 0–6)
BASOPHILS ABSOLUTE: 0.3 K/UL (ref 0–0.2)
BASOPHILS RELATIVE PERCENT: 3 %
BUN BLDV-MCNC: 16 MG/DL (ref 7–20)
CALCIUM SERPL-MCNC: 7 MG/DL (ref 8.3–10.6)
CHLORIDE BLD-SCNC: 117 MMOL/L (ref 99–110)
CO2: 18 MMOL/L (ref 21–32)
CREAT SERPL-MCNC: 1.3 MG/DL (ref 0.8–1.3)
EOSINOPHILS ABSOLUTE: 0.5 K/UL (ref 0–0.6)
EOSINOPHILS RELATIVE PERCENT: 6 %
GFR AFRICAN AMERICAN: >60
GFR NON-AFRICAN AMERICAN: 53
GLUCOSE BLD-MCNC: 90 MG/DL (ref 70–99)
HCT VFR BLD CALC: 29.1 % (ref 40.5–52.5)
HEMOGLOBIN: 9.7 G/DL (ref 13.5–17.5)
LYMPHOCYTES ABSOLUTE: 1.6 K/UL (ref 1–5.1)
LYMPHOCYTES RELATIVE PERCENT: 16 %
MCH RBC QN AUTO: 32.3 PG (ref 26–34)
MCHC RBC AUTO-ENTMCNC: 33.5 G/DL (ref 31–36)
MCV RBC AUTO: 96.4 FL (ref 80–100)
MONOCYTES ABSOLUTE: 0.4 K/UL (ref 0–1.3)
MONOCYTES RELATIVE PERCENT: 4 %
NEUTROPHILS ABSOLUTE: 6.1 K/UL (ref 1.7–7.7)
NEUTROPHILS RELATIVE PERCENT: 69 %
PDW BLD-RTO: 16.6 % (ref 12.4–15.4)
PHOSPHORUS: 2.5 MG/DL (ref 2.5–4.9)
PLATELET # BLD: 335 K/UL (ref 135–450)
PLATELET SLIDE REVIEW: ADEQUATE
PMV BLD AUTO: 7.6 FL (ref 5–10.5)
POIKILOCYTES: ABNORMAL
POTASSIUM SERPL-SCNC: 4.6 MMOL/L (ref 3.5–5.1)
RBC # BLD: 3.02 M/UL (ref 4.2–5.9)
SLIDE REVIEW: ABNORMAL
SODIUM BLD-SCNC: 143 MMOL/L (ref 136–145)
WBC # BLD: 8.8 K/UL (ref 4–11)

## 2018-09-25 PROCEDURE — 80069 RENAL FUNCTION PANEL: CPT

## 2018-09-25 PROCEDURE — 99232 SBSQ HOSP IP/OBS MODERATE 35: CPT | Performed by: INTERNAL MEDICINE

## 2018-09-25 PROCEDURE — 2580000003 HC RX 258: Performed by: INTERNAL MEDICINE

## 2018-09-25 PROCEDURE — 6360000002 HC RX W HCPCS: Performed by: INTERNAL MEDICINE

## 2018-09-25 PROCEDURE — 1200000000 HC SEMI PRIVATE

## 2018-09-25 PROCEDURE — 2500000003 HC RX 250 WO HCPCS: Performed by: INTERNAL MEDICINE

## 2018-09-25 PROCEDURE — 97116 GAIT TRAINING THERAPY: CPT

## 2018-09-25 PROCEDURE — 97110 THERAPEUTIC EXERCISES: CPT

## 2018-09-25 PROCEDURE — 6370000000 HC RX 637 (ALT 250 FOR IP): Performed by: INTERNAL MEDICINE

## 2018-09-25 PROCEDURE — 97530 THERAPEUTIC ACTIVITIES: CPT

## 2018-09-25 PROCEDURE — 97535 SELF CARE MNGMENT TRAINING: CPT

## 2018-09-25 PROCEDURE — 85025 COMPLETE CBC W/AUTO DIFF WBC: CPT

## 2018-09-25 RX ADMIN — POTASSIUM CHLORIDE 20 MEQ: 20 TABLET, EXTENDED RELEASE ORAL at 08:22

## 2018-09-25 RX ADMIN — FIDAXOMICIN 200 MG: 200 TABLET, FILM COATED ORAL at 09:34

## 2018-09-25 RX ADMIN — DONEPEZIL HYDROCHLORIDE 5 MG: 5 TABLET, FILM COATED ORAL at 20:37

## 2018-09-25 RX ADMIN — APIXABAN 2.5 MG: 5 TABLET, FILM COATED ORAL at 20:37

## 2018-09-25 RX ADMIN — SODIUM BICARBONATE: 84 INJECTION, SOLUTION INTRAVENOUS at 20:40

## 2018-09-25 RX ADMIN — FIDAXOMICIN 200 MG: 200 TABLET, FILM COATED ORAL at 20:43

## 2018-09-25 RX ADMIN — ALPRAZOLAM 0.5 MG: 0.5 TABLET ORAL at 21:38

## 2018-09-25 RX ADMIN — METOPROLOL TARTRATE 12.5 MG: 25 TABLET ORAL at 08:22

## 2018-09-25 RX ADMIN — APIXABAN 2.5 MG: 5 TABLET, FILM COATED ORAL at 08:22

## 2018-09-25 RX ADMIN — Medication 500 MG: at 23:22

## 2018-09-25 RX ADMIN — AMIODARONE HYDROCHLORIDE 200 MG: 200 TABLET ORAL at 08:22

## 2018-09-25 RX ADMIN — Medication 500 MG: at 12:27

## 2018-09-25 RX ADMIN — Medication 500 MG: at 00:57

## 2018-09-25 RX ADMIN — METRONIDAZOLE 500 MG: 500 INJECTION, SOLUTION INTRAVENOUS at 08:21

## 2018-09-25 RX ADMIN — Medication 10 ML: at 08:23

## 2018-09-25 RX ADMIN — METRONIDAZOLE 500 MG: 500 INJECTION, SOLUTION INTRAVENOUS at 00:57

## 2018-09-25 RX ADMIN — SODIUM BICARBONATE: 84 INJECTION, SOLUTION INTRAVENOUS at 11:12

## 2018-09-25 RX ADMIN — METRONIDAZOLE 500 MG: 500 INJECTION, SOLUTION INTRAVENOUS at 15:44

## 2018-09-25 RX ADMIN — CYANOCOBALAMIN TAB 500 MCG 500 MCG: 500 TAB at 08:22

## 2018-09-25 RX ADMIN — Medication 500 MG: at 17:27

## 2018-09-25 RX ADMIN — POTASSIUM CHLORIDE 20 MEQ: 20 TABLET, EXTENDED RELEASE ORAL at 20:37

## 2018-09-25 RX ADMIN — Medication 500 MG: at 06:39

## 2018-09-25 NOTE — PROGRESS NOTES
18 09/25/2018    BUN 16 09/25/2018     Lab Results   Component Value Date    CREATININE 1.3 09/25/2018     A/P  1. Recurrent Severe C diff - continue vanc, dificid. Will stop IV flagyl for now. Won't be in hospital to see tomorrow. Available by [facility] and Dr. Reji Reyes will be in [facility].     Patient Active Problem List   Diagnosis Code    Coumadin toxicity T45.511A    Atrial fibrillation (HCC) I48.91    Congestive heart failure (CHF) ([facility]) I50.9    Chronic kidney disease (CKD), stage IV (severe) ([facility]) N18.4    Gout M10.9    Chronic right-sided low back pain without sciatica M54.5, G89.29    Watts's esophagus without dysplasia K22.70    Cardiomyopathy ([facility]) I42.9    Anxiety F41.9    Colitis due to Clostridium difficile A04.72    Diarrhea R19.7    Acute renal failure (ARF) ([facility]) N17.9    Hx of Clostridium difficile infection Z86.19    FTT (failure to thrive) in adult R62.7    Moderate protein-calorie malnutrition (HCC) E44.0    Clostridium difficile colitis B50.02    Metabolic acidosis T76.6             JOSEHP ADAMSON 9/25/18 4:06 PM

## 2018-09-25 NOTE — PROGRESS NOTES
Inpatient Physical Therapy Daily Treatment Note    Unit:  [address]   Date:  9/25/2018  Patient Name:    Ilan Mckeon  Admitting diagnosis:  Acute renal [facility] [N17.9]  Admit Date:  9/18/2018  Precautions/Restrictions:  Contact (C.Diff), Stage 2 coccyx, Stage 1 on hip, fall risk, IV    Discharge Recommendations: SNF  DME needs at discharge: Defer to SNF    AM-PAC Mobility Score   AM-PAC Inpatient Mobility Raw Score : 17        Treatment Time: 10:35 - 11:00  Treatment Number:  4    Subjective    Pt. Sitting up in chair with no family present. Pt. agreeable to this PT tx. Pt reports feeling better today, \"but still weak. \"    Pain    None at rest  3/10 low back pain/\"stiffness\" with Claudy  Bed Mobility NT, pt up in chair  Supine to Sit:   Sit to Supine:   Rolling:    Scooting:  IND in chair    Transfer Training   Sit to stand: mod A from low chair, x3; CGA to min A from higher chair x5 (6 tranfers completed consecutively for training + x2 additional)  Stand to sit: CGA to both low and higher chair with VC for safety, hand placement; x6 consecutive + x2 additional     Gait Training   Patient ambulated 150' with RW and SBA. Required VC to keep walker close to body and to improve posture. Pt ambulates slowly with short step length and flexed posture. Therapeutic Exercise pt demonstrated independence with these seated Claudy  Sit to stands: see trs. Training  LAQ: x10 B  Seated march: x20  Seated hip abd/add: x10 B    Balance     Sitting:  Good (static and dynamic)  Standing Static: Good with RW                     Dynamic good (-)  Patient Education       PT role, POC, safety with transfers, importance of OOB mobility, dc recommendations   Positioning Needs       Pt sitting up in chair, alarm active, call light and needs in reach.      Activity Tolerance   No issues   Assessment   Patient continues to show great progress with gait, today ambulating 150' with RW and SBA, though continues to require min to mod A for transfers 2/2 LE weakness. This puts pt at high risk for falls up dc. Pt would benefit from continued skilled PT in SNF to safely progress IND with functional mobility and reduce risk for falls. Goal(s) : To be met in 3 visits:  1). Independent with LE Ex x 10 reps - Met     To be met in 6 visits:  1). Supine to/from sit: upgrade to SBA with HOB flat - Met  2). Sit to/from stand: upgrade to SBA - Not met, continue to work toward goal (met from higher surface only)  3). Bed to chair: upgrade to SBA - Met through gait  4). UPGRADE 9/24: ambulate 150' with RW and SBA - Met  5). Tolerate B LE exercises 10 reps : - Met, continue for consistency      Plan   Continue with plan of care. At end of treatment, patient in chair , alarm active,  with call light and needs within reach. Time Coded Treatment Minutes: 25 minutes    Total Treatment Time:   25 minutes    Ascencion Blackwood PT, DPT #[id number]     If patient discharges from this facility prior to next visit, this note will serve as the Discharge Summary.

## 2018-09-25 NOTE — PROGRESS NOTES
At end of treatment, pt. In bed side chair, alarm activated with call light and needs within reach. RN notified. Goals: To be met in 3 Visits:  1). Indep with UE ex x 10 reps     To be met in 5 Visits:  1). Supine to Sit MIN A  Goal met and updated --> Indpt  2). Bed to Chair/BSC MIN A  Goal met and updated --> Indpt   3). Upper Body Bathing MIN A  Goal met and update --> independent   4). Lower Body Bathing MOD A   5). Upper Body Dressing MIN A Goal met and update --> independent   6).  Lower Body Dressing MOD A    Plan: cont with POC    Timed Code Treatment Minutes:  33  minutes  Total Treatment Time: 33  minutes    Signature, License #    Mi Colon, OTR/L #[id number]         If patient discharges from this facility prior to next visit, this note will serve as the Discharge Summary

## 2018-09-26 VITALS
TEMPERATURE: 97 F | WEIGHT: 165.5 LBS | OXYGEN SATURATION: 96 % | RESPIRATION RATE: 14 BRPM | DIASTOLIC BLOOD PRESSURE: 70 MMHG | BODY MASS INDEX: 25.98 KG/M2 | HEIGHT: 67 IN | SYSTOLIC BLOOD PRESSURE: 115 MMHG | HEART RATE: 70 BPM

## 2018-09-26 LAB
ALBUMIN SERPL-MCNC: 1.8 G/DL (ref 3.4–5)
ANION GAP SERPL CALCULATED.3IONS-SCNC: 6 MMOL/L (ref 3–16)
ANISOCYTOSIS: ABNORMAL
BANDED NEUTROPHILS RELATIVE PERCENT: 2 % (ref 0–7)
BASOPHILS ABSOLUTE: 0.2 K/UL (ref 0–0.2)
BASOPHILS RELATIVE PERCENT: 2 %
BUN BLDV-MCNC: 17 MG/DL (ref 7–20)
CALCIUM SERPL-MCNC: 6.9 MG/DL (ref 8.3–10.6)
CHLORIDE BLD-SCNC: 112 MMOL/L (ref 99–110)
CO2: 20 MMOL/L (ref 21–32)
CREAT SERPL-MCNC: 1.3 MG/DL (ref 0.8–1.3)
EOSINOPHILS ABSOLUTE: 0.4 K/UL (ref 0–0.6)
EOSINOPHILS RELATIVE PERCENT: 4 %
GFR AFRICAN AMERICAN: >60
GFR NON-AFRICAN AMERICAN: 53
GLUCOSE BLD-MCNC: 80 MG/DL (ref 70–99)
HCT VFR BLD CALC: 29.7 % (ref 40.5–52.5)
HEMOGLOBIN: 10 G/DL (ref 13.5–17.5)
LYMPHOCYTES ABSOLUTE: 1.2 K/UL (ref 1–5.1)
LYMPHOCYTES RELATIVE PERCENT: 13 %
MCH RBC QN AUTO: 32.6 PG (ref 26–34)
MCHC RBC AUTO-ENTMCNC: 33.7 G/DL (ref 31–36)
MCV RBC AUTO: 96.7 FL (ref 80–100)
MONOCYTES ABSOLUTE: 0.5 K/UL (ref 0–1.3)
MONOCYTES RELATIVE PERCENT: 6 %
NEUTROPHILS ABSOLUTE: 6.7 K/UL (ref 1.7–7.7)
NEUTROPHILS RELATIVE PERCENT: 73 %
PDW BLD-RTO: 16.8 % (ref 12.4–15.4)
PHOSPHORUS: 2.3 MG/DL (ref 2.5–4.9)
PLATELET # BLD: 333 K/UL (ref 135–450)
PLATELET SLIDE REVIEW: ADEQUATE
PMV BLD AUTO: 7.3 FL (ref 5–10.5)
POTASSIUM SERPL-SCNC: 4.3 MMOL/L (ref 3.5–5.1)
RBC # BLD: 3.08 M/UL (ref 4.2–5.9)
SLIDE REVIEW: ABNORMAL
SODIUM BLD-SCNC: 138 MMOL/L (ref 136–145)
WBC # BLD: 8.9 K/UL (ref 4–11)

## 2018-09-26 PROCEDURE — 97530 THERAPEUTIC ACTIVITIES: CPT

## 2018-09-26 PROCEDURE — 85025 COMPLETE CBC W/AUTO DIFF WBC: CPT

## 2018-09-26 PROCEDURE — 6370000000 HC RX 637 (ALT 250 FOR IP): Performed by: INTERNAL MEDICINE

## 2018-09-26 PROCEDURE — 2500000003 HC RX 250 WO HCPCS: Performed by: INTERNAL MEDICINE

## 2018-09-26 PROCEDURE — 2580000003 HC RX 258: Performed by: INTERNAL MEDICINE

## 2018-09-26 PROCEDURE — 97535 SELF CARE MNGMENT TRAINING: CPT

## 2018-09-26 PROCEDURE — 97116 GAIT TRAINING THERAPY: CPT

## 2018-09-26 PROCEDURE — 80069 RENAL FUNCTION PANEL: CPT

## 2018-09-26 PROCEDURE — G8988 SELF CARE GOAL STATUS: HCPCS

## 2018-09-26 PROCEDURE — 99239 HOSP IP/OBS DSCHRG MGMT >30: CPT | Performed by: INTERNAL MEDICINE

## 2018-09-26 PROCEDURE — G8987 SELF CARE CURRENT STATUS: HCPCS

## 2018-09-26 RX ORDER — VANCOMYCIN HYDROCHLORIDE 250 MG/1
CAPSULE ORAL
Qty: 40 CAPSULE | Refills: 0 | Status: ON HOLD
Start: 2018-09-26 | End: 2018-10-17 | Stop reason: HOSPADM

## 2018-09-26 RX ADMIN — Medication 500 MG: at 05:36

## 2018-09-26 RX ADMIN — POTASSIUM CHLORIDE 20 MEQ: 20 TABLET, EXTENDED RELEASE ORAL at 10:03

## 2018-09-26 RX ADMIN — AMIODARONE HYDROCHLORIDE 200 MG: 200 TABLET ORAL at 10:03

## 2018-09-26 RX ADMIN — SODIUM BICARBONATE: 84 INJECTION, SOLUTION INTRAVENOUS at 10:06

## 2018-09-26 RX ADMIN — METOPROLOL TARTRATE 12.5 MG: 25 TABLET ORAL at 10:04

## 2018-09-26 RX ADMIN — APIXABAN 2.5 MG: 5 TABLET, FILM COATED ORAL at 10:04

## 2018-09-26 RX ADMIN — CYANOCOBALAMIN TAB 500 MCG 500 MCG: 500 TAB at 10:05

## 2018-09-26 RX ADMIN — FIDAXOMICIN 200 MG: 200 TABLET, FILM COATED ORAL at 10:06

## 2018-09-26 NOTE — PROGRESS NOTES
Shift assessment completed. See flow sheet. Respiration easy, even and non labored. Vital signs WNL. Pt alert and oriented. Side rails up x 2. IJ infusing without complications. Pt denies any needs at this time. Call light within reach. Will continue to monitor.

## 2018-09-26 NOTE — PROGRESS NOTES
Skin Integrity, Wound Healing, Weight, Pertinent Labs, Diarrhea    See Adult Nutrition Doc Flowsheet for more detail.      Electronically signed by Obed Moran RD, JOSEMANUEL on 9/26/18 at 10:52 AM    Contact Number: [phone]

## 2018-09-26 NOTE — PROGRESS NOTES
Inpatient Physical Therapy Daily Treatment Note    Unit:  [address]   Date:  9/26/2018  Patient Name:    Danish Kirkland  Admitting diagnosis:  [facility] [N17.9[facility]  Admit Date:  9/18/2018  Precautions/Restrictions:  Contact (C.Diff), Stage 2 coccyx, Stage 1 on hip, fall risk, RIJ line, telemetry     Discharge Recommendations: SNF  DME needs at discharge: Defer to SNF    AM-PAC Mobility Score   AM-PAC Inpatient Mobility Raw Score : 17        Treatment Time: 09:28 - 10:00  Treatment Number:  5    Subjective    Pt. Supine in bed with no family present. Pt. agreeable to this PT tx. Pt reports discomfort in bed, and then it was discovered that pt had been laying on his telemetry box. Pt had had a small loose BM in bed prior to initiation of tx. PCA in room to assist with clean up prior to pt getting up. Pain    No c/o pain  Bed Mobility   Supine to Sit: SBA + extra time to complete (to L side; pt states he has increased difficulty to R side 2/2 weakness in RUE)  Sit to Supine: NT  Rolling:  Supervision to each side  Scooting:  IND in chair and at EOB    Transfer Training   Sit to stand: mod A from low chair; CGA from EOB  Stand to sit: CGA to chair x2    Gait Training   Patient ambulated 200' with RW and SBA. Required VC to keep walker close to body and to improve posture. Pt ambulates slowly with short step length and flexed posture. Therapeutic Exercise   Mini squats: x12 with RW and SBA, VC & TC for posture   Standing hip abd/add: x12 B with RW and CGA    Balance     Sitting:  Good (static and dynamic)  Standing Static: Good with RW                     Dynamic good (-)  Patient Education       PT role, POC, safety, dc recommendations, posture   Positioning Needs       Pt sitting up in chair, alarm active, call light and needs in reach. RN in room to administer medications.    Activity Tolerance   No issues   Assessment   Patient continues to show great progress with gait, today ambulating 200' with RW and SBA, though continues to require up to mod A for transfers 2/2 LE weakness. This puts pt at high risk for falls up dc. Pt would benefit from continued skilled PT in SNF to safely progress IND with functional mobility and reduce risk for falls. Goal(s) : To be met in 3 visits:  1). Independent with LE Ex x 10 reps - Met     To be met in 6 visits:  1). Supine to/from sit: upgrade to SBA with HOB flat - Met  2). Sit to/from stand: upgrade to SBA - Not met, continue to work toward goal (met from higher surface only)  3). Bed to chair: upgrade to SBA - Met through gait  4). UPGRADE 9/24: ambulate 150' with RW and SBA - Met  5). Tolerate B LE exercises 10 reps : - Met, continue for consistency    6). NEW goal 9/26: ambulate 250' with RW and supervision    Plan   Continue with plan of care. At end of treatment, patient in chair , alarm active,  with call light and needs within reach. Time Coded Treatment Minutes: 32 minutes    Total Treatment Time:   32 minutes    César Gutirerez, PT, DPT #[id number]     If patient discharges from this facility prior to next visit, this note will serve as the Discharge Summary.

## 2018-09-26 NOTE — PROGRESS NOTES
20*   PHOS  2.3*  2.5  2.3*   BUN  17  16  17   CREATININE  1.2  1.3  1.3     LIVER PROFILE:   No results for input(s): AST, ALT, LIPASE, BILIDIR, BILITOT, ALKPHOS in the last 72 hours. Invalid input(s): AMYLASE,  ALB  PT/INR:   No results for input(s): PROTIME, INR in the last 72 hours. CULTURES    C diff: positive    GI bacterial pathogens: negative     RADIOLOGY    CT ABDOMEN PELVIS WO CONTRAST Additional Contrast? None 9/19/2018   Final Result   Nonspecific colitis, pseudomembranous colitis is considered         XR CHEST STANDARD (2 VW) 9/18/2018   Final Result   Irregular linear opacities are seen within both lungs which may be related to   chronic fibrosis/interstitial disease with mild superimposed airspace disease   not excluded. Assessment/Plan:    Acute on Recurrent C diff colitis  - previously completed 2 courses of Dificid  - Admit to Med Surg  - + Cdiff  - positive occult blood stool due to colitis. - Sigmoidoscopy with pseudomembranous colitis findings. - Cont PO Vanc and dificid  - advance diet   Plan for fecal transplant as outpatient      Hypokalemia  - 2.7  - improved with supplement   - repeat K better    Hypomagnesemia -   4g Mg IV today. NAGMA - sec to severe diarrhea - mild    MORGAN on CKD stage IV  - baseline Cr ~1.7-2  - Cr on admission: 2.4, improving to 1.3    Leukocytosis  - 26.5  - 2/2 above  - repeat 14 to 12    Atrial Fibrillation  - AC: Eliquis --> dose decreased to 2.5 mg, held for planned scope - resumed 9/21  - cont amiodarone, Lopressor  - tele monitor    Poor prognosis. Doing better clinically.      DVT Prophylaxis: Eliquis  Diet: DIET GENERAL;  Code Status: Full Code    D/c to SNF    Mouna Cruz MD 9/26/2018 10:27 AM

## 2018-09-27 LAB
EKG ATRIAL RATE: 70 BPM
EKG DIAGNOSIS: NORMAL
EKG P-R INTERVAL: 120 MS
EKG Q-T INTERVAL: 472 MS
EKG QRS DURATION: 190 MS
EKG QTC CALCULATION (BAZETT): 509 MS
EKG R AXIS: 157 DEGREES
EKG T AXIS: -26 DEGREES
EKG VENTRICULAR RATE: 70 BPM

## 2018-09-28 ENCOUNTER — TELEPHONE (OUTPATIENT)
Dept: GASTROENTEROLOGY | Age: 83
End: 2018-09-28

## 2018-10-01 ENCOUNTER — TELEPHONE (OUTPATIENT)
Dept: GASTROENTEROLOGY | Age: 83
End: 2018-10-01

## 2018-10-01 NOTE — TELEPHONE ENCOUNTER
Pt wife called in to schedule Colon with Fecal Transplant, we scheduled for [email or web address] at [facility]

## 2018-10-17 ENCOUNTER — ANESTHESIA EVENT (OUTPATIENT)
Dept: ENDOSCOPY | Age: 83
End: 2018-10-17
Payer: COMMERCIAL

## 2018-10-17 ENCOUNTER — HOSPITAL ENCOUNTER (OUTPATIENT)
Age: 83
Setting detail: OUTPATIENT SURGERY
Discharge: HOME | End: 2018-10-17
Attending: INTERNAL MEDICINE | Admitting: INTERNAL MEDICINE
Payer: COMMERCIAL

## 2018-10-17 ENCOUNTER — ANESTHESIA (OUTPATIENT)
Dept: ENDOSCOPY | Age: 83
End: 2018-10-17
Payer: COMMERCIAL

## 2018-10-17 VITALS
DIASTOLIC BLOOD PRESSURE: 71 MMHG | OXYGEN SATURATION: 83 % | SYSTOLIC BLOOD PRESSURE: 98 MMHG | RESPIRATION RATE: 28 BRPM

## 2018-10-17 VITALS
DIASTOLIC BLOOD PRESSURE: 67 MMHG | SYSTOLIC BLOOD PRESSURE: 108 MMHG | RESPIRATION RATE: 18 BRPM | HEART RATE: 89 BPM | OXYGEN SATURATION: 100 % | TEMPERATURE: 98.1 F

## 2018-10-17 PROCEDURE — 2580000003 HC RX 258: Performed by: INTERNAL MEDICINE

## 2018-10-17 PROCEDURE — 3700000000 HC ANESTHESIA ATTENDED CARE: Performed by: INTERNAL MEDICINE

## 2018-10-17 PROCEDURE — 6360000002 HC RX W HCPCS: Performed by: NURSE ANESTHETIST, CERTIFIED REGISTERED

## 2018-10-17 PROCEDURE — 2709999900 HC NON-CHARGEABLE SUPPLY: Performed by: INTERNAL MEDICINE

## 2018-10-17 PROCEDURE — 7100000010 HC PHASE II RECOVERY - FIRST 15 MIN: Performed by: INTERNAL MEDICINE

## 2018-10-17 PROCEDURE — 3609155000 HC COLONOSCOPY FECAL TRANSPLANT: Performed by: INTERNAL MEDICINE

## 2018-10-17 PROCEDURE — 3700000001 HC ADD 15 MINUTES (ANESTHESIA): Performed by: INTERNAL MEDICINE

## 2018-10-17 PROCEDURE — 7100000011 HC PHASE II RECOVERY - ADDTL 15 MIN: Performed by: INTERNAL MEDICINE

## 2018-10-17 PROCEDURE — 6370000000 HC RX 637 (ALT 250 FOR IP): Performed by: INTERNAL MEDICINE

## 2018-10-17 PROCEDURE — 45378 DIAGNOSTIC COLONOSCOPY: CPT | Performed by: INTERNAL MEDICINE

## 2018-10-17 PROCEDURE — 44705 PREPARE FECAL MICROBIOTA: CPT | Performed by: INTERNAL MEDICINE

## 2018-10-17 RX ORDER — SODIUM CHLORIDE, SODIUM LACTATE, POTASSIUM CHLORIDE, CALCIUM CHLORIDE 600; 310; 30; 20 MG/100ML; MG/100ML; MG/100ML; MG/100ML
INJECTION, SOLUTION INTRAVENOUS CONTINUOUS PRN
Status: DISCONTINUED | OUTPATIENT
Start: 2018-10-17 | End: 2018-10-17 | Stop reason: HOSPADM

## 2018-10-17 RX ORDER — SODIUM CHLORIDE, SODIUM LACTATE, POTASSIUM CHLORIDE, CALCIUM CHLORIDE 600; 310; 30; 20 MG/100ML; MG/100ML; MG/100ML; MG/100ML
INJECTION, SOLUTION INTRAVENOUS CONTINUOUS
Status: DISCONTINUED | OUTPATIENT
Start: 2018-10-17 | End: 2018-10-17 | Stop reason: HOSPADM

## 2018-10-17 RX ORDER — DIPHENOXYLATE HYDROCHLORIDE AND ATROPINE SULFATE 2.5; .025 MG/1; MG/1
1 TABLET ORAL ONCE
Status: COMPLETED | OUTPATIENT
Start: 2018-10-17 | End: 2018-10-17

## 2018-10-17 RX ORDER — PROPOFOL 10 MG/ML
INJECTION, EMULSION INTRAVENOUS PRN
Status: DISCONTINUED | OUTPATIENT
Start: 2018-10-17 | End: 2018-10-17 | Stop reason: SDUPTHER

## 2018-10-17 RX ADMIN — PROPOFOL 100 MG: 10 INJECTION, EMULSION INTRAVENOUS at 10:35

## 2018-10-17 RX ADMIN — DIPHENOXYLATE HYDROCHLORIDE AND ATROPINE SULFATE 1 TABLET: 2.5; .025 TABLET ORAL at 10:22

## 2018-10-17 RX ADMIN — SODIUM CHLORIDE, POTASSIUM CHLORIDE, SODIUM LACTATE AND CALCIUM CHLORIDE: 600; 310; 30; 20 INJECTION, SOLUTION INTRAVENOUS at 10:36

## 2018-10-17 ASSESSMENT — PAIN SCALES - GENERAL
PAINLEVEL_OUTOF10: 0

## 2018-10-17 NOTE — PROGRESS NOTES
Received patient from GI room. Report received from [address], Jean Claude Fonseca RN, and Natalie Licea. Patient is awake.

## 2018-10-17 NOTE — OP NOTE
[address] ,  [address] [facility]  Phone: [phone]   DCK:[phone]    Colonoscopy Procedure Note    Patient: Diana Warren  : 1934    Procedure: Colonoscopy with fecal microbiota transplantation    Date:  10/17/2018     Endoscopist:  Jones Huynh MD    Referring Physician:  Som Coon MD    Preoperative Diagnosis:  C-DIFF    Postoperative Diagnosis:  See impression    Anesthesia: Anesthesia: MAC  Sedation: see anesthesia notes for details. Start Time: 10:54  Stop Time: 11:09  Withdrawal time: NA  ASA Class: 3  Mallampati: II (soft palate, uvula, fauces visible)    Indications: This is a 80y.o. year old male who presents today for colonoscopy for fecal transplant. PMH of CAD, cardiomyopathy, CHF, Afib on Eliquis for anticoagulation, prior defibrillator. Patient has mild dementia. Patient has recurrent C diff colitis that has been difficult to treat despite multiple Vancomycin regimens including multiple extended Vanc tapers. He has had Dificid courses multiple times. He had severe pseudomembranous colitis on flex sig 18 at last hospital admission. He is here for a fecal microbiota transplant for recurrent C diff. Patient given a dose of Lomotil before the procedure. Procedure Details  Informed consent was obtained for the procedure, including sedation. Risks of perforation, hemorrhage, adverse drug reaction and aspiration were discussed. The patient was placed in the left lateral decubitus position. Based on the pre-procedure assessment, including review of the patient's medical history, medications, allergies, and review of systems, he had been deemed to be an appropriate candidate for conscious sedation; he was therefore sedated with the medications listed below. The patient was monitored continuously with ECG tracing, pulse oximetry, blood pressure monitoring, and direct observations. rectal examination was performed. hemorrhoids. Recommendations:  - Fecal microbiota transplantation performed with colonoscopy. - Take Immodium 2 mg every 6 hours for 3 doses   - Avoid Vancomycin or other antibiotics for 2 weeks as far as possible  - Ok to restart the [employer]. Recommendations discussed with patients wife.         Rolando Rowan 10/17/18 10:06 AM

## 2018-10-17 NOTE — ANESTHESIA PRE PROCEDURE
taking differently: Take 10 mEq by mouth 2 times daily ) 60 tablet 3    ammonium lactate (LAC-HYDRIN) 12 % lotion Apply topically daily. 1 Bottle 1    omeprazole (PRILOSEC) 20 MG delayed release capsule TAKE 1 CAPSULE BY MOUTH DAILY 30 capsule 5    vitamin B-12 (CYANOCOBALAMIN) 500 MCG tablet Take 500 mcg by mouth daily      bimatoprost (LUMIGAN) 0.01 % SOLN ophthalmic drops Place 1 drop into the left eye nightly         Allergies: Allergies   Allergen Reactions    Warfarin Itching       Problem List:    Patient Active Problem List   Diagnosis Code    Coumadin toxicity T45.511A    Atrial fibrillation (HCC) I48.91    Congestive heart failure (CHF) (HCC) I50.9    Chronic kidney disease (CKD), stage IV (severe) (HCC) N18.4    Gout M10.9    Chronic right-sided low back pain without sciatica M54.5, G89.29    Watts's esophagus without dysplasia K22.70    Cardiomyopathy (HCC) I42.9    Anxiety F41.9    Colitis due to Clostridium difficile A04.72    Diarrhea R19.7    Acute renal failure (ARF) (HCC) N17.9    Hx of Clostridium difficile infection Z86.19    FTT (failure to thrive) in adult R62.7    Moderate protein-calorie malnutrition (HCC) E44.0    Clostridium difficile colitis P98.24    Metabolic acidosis J03.4       Past Medical History:        Diagnosis Date    Arthritis     Atrial fibrillation (HCC) atrial fibrillation, pacer    Cardiomyopathy ([address]     Cataract     Chronic kidney disease (CKD), stage IV (severe) (HCC)     Clostridium difficile infection 09/18/2018    Congestive heart failure (CHF) (HCC)     Gout        Past Surgical History:        Procedure Laterality Date    APPENDECTOMY      CARDIAC DEFIBRILLATOR PLACEMENT      CARDIAC SURGERY Left     5/13/17    CATARACT REMOVAL WITH IMPLANT Right 06/17/2017    [facility]/Dr Lucas Herring    CHOLECYSTECTOMY      HERNIA REPAIR Bilateral     PACEMAKER INSERTION      TX OFFICE/OUTPT VISIT,PROCEDURE ONLY N/A 9/21/2018    FLEX.  SIG NF

## 2018-10-17 NOTE — H&P
[place] [address] ,  [address] [facility]  Phone: [phone]    CHIEF COMPLAINT     Here for fecal transplant with colonoscopy    HPI     80 y. o. male with PMH of CAD, cardiomyopathy, CHF, Afib on Eliquis for anticoagulation, prior defibrillator. Patient has mild dementia. Patient has recurrent C diff colitis that has been difficult to treat despite multiple Vancomycin regimens including multiple extended Vanc tapers. He has had Dificid courses multiple times. He had severe pseudomembranous colitis on flex sig 18 at last hospital admission. He is here for a fecal microbiota transplant for recurrent C diff. PAST MEDICAL HISTORY     Past Medical History:   Diagnosis Date    Arthritis     Atrial fibrillation (HCC) atrial fibrillation, pacer    Cardiomyopathy [facility]     Cataract     Chronic kidney disease (CKD), stage IV (severe) (HCC)     Clostridium difficile infection 2018    Congestive heart failure (CHF) (HCC)     Gout      FAMILY HISTORY     Family History   Problem Relation Age of Onset    Heart Disease Mother         abn valve    Coronary Art Dis Father          of MI    Stroke Sister     Hypertension Sister     Heart Attack Son 52    Hypertension Grandchild      SOCIAL HISTORY     Social History     Social History    Marital status:      Spouse name: N/A    Number of children: N/A    Years of education: N/A     Occupational History    Not on file.      Social History Main Topics    Smoking status: Never Smoker    Smokeless tobacco: Never Used    Alcohol use No    Drug use: No    Sexual activity: Yes     Partners: Female     Other Topics Concern    Not on file     Social History Narrative    No narrative on file     SURGICAL HISTORY     Past Surgical History:   Procedure Laterality Date    APPENDECTOMY      CARDIAC DEFIBRILLATOR PLACEMENT      CARDIAC SURGERY Left     17    CATARACT REMOVAL WITH

## 2018-10-23 ENCOUNTER — TELEPHONE (OUTPATIENT)
Dept: GASTROENTEROLOGY | Age: 83
End: 2018-10-23

## 2018-10-23 ENCOUNTER — TELEPHONE (OUTPATIENT)
Dept: FAMILY MEDICINE CLINIC | Age: 83
End: 2018-10-23

## 2018-10-30 RX ORDER — MIRTAZAPINE 30 MG/1
30 TABLET, FILM COATED ORAL NIGHTLY
Qty: 30 TABLET | Refills: 3 | Status: SHIPPED | OUTPATIENT
Start: 2018-10-30 | End: 2019-03-08 | Stop reason: SDUPTHER

## 2018-11-07 RX ORDER — APIXABAN 5 MG/1
5 TABLET, FILM COATED ORAL 2 TIMES DAILY
Qty: 60 TABLET | Refills: 3 | Status: SHIPPED | OUTPATIENT
Start: 2018-11-07 | End: 2018-12-07

## 2018-11-08 ENCOUNTER — OFFICE VISIT (OUTPATIENT)
Dept: FAMILY MEDICINE CLINIC | Age: 83
End: 2018-11-08
Payer: COMMERCIAL

## 2018-11-08 VITALS
HEART RATE: 80 BPM | BODY MASS INDEX: 21.93 KG/M2 | SYSTOLIC BLOOD PRESSURE: 92 MMHG | OXYGEN SATURATION: 96 % | WEIGHT: 140 LBS | DIASTOLIC BLOOD PRESSURE: 74 MMHG

## 2018-11-08 DIAGNOSIS — E83.42 HYPOMAGNESEMIA: ICD-10-CM

## 2018-11-08 DIAGNOSIS — I48.0 PAROXYSMAL ATRIAL FIBRILLATION (HCC): Primary | ICD-10-CM

## 2018-11-08 DIAGNOSIS — I50.9 CHRONIC CONGESTIVE HEART FAILURE, UNSPECIFIED HEART FAILURE TYPE (HCC): ICD-10-CM

## 2018-11-08 DIAGNOSIS — E03.9 HYPOTHYROIDISM, UNSPECIFIED TYPE: ICD-10-CM

## 2018-11-08 DIAGNOSIS — A04.72 COLITIS DUE TO CLOSTRIDIUM DIFFICILE: ICD-10-CM

## 2018-11-08 DIAGNOSIS — I42.9 CARDIOMYOPATHY, UNSPECIFIED TYPE (HCC): ICD-10-CM

## 2018-11-08 PROCEDURE — 99214 OFFICE O/P EST MOD 30 MIN: CPT | Performed by: FAMILY MEDICINE

## 2018-11-08 RX ORDER — METOLAZONE 2.5 MG/1
2.5 TABLET ORAL DAILY
COMMUNITY
End: 2019-03-05 | Stop reason: ALTCHOICE

## 2018-11-08 RX ORDER — LEVOTHYROXINE SODIUM 0.03 MG/1
25 TABLET ORAL DAILY
COMMUNITY
End: 2018-11-10 | Stop reason: SDUPTHER

## 2018-11-08 ASSESSMENT — PATIENT HEALTH QUESTIONNAIRE - PHQ9
SUM OF ALL RESPONSES TO PHQ9 QUESTIONS 1 & 2: 0
SUM OF ALL RESPONSES TO PHQ QUESTIONS 1-9: 0
SUM OF ALL RESPONSES TO PHQ QUESTIONS 1-9: 0
1. LITTLE INTEREST OR PLEASURE IN DOING THINGS: 0
2. FEELING DOWN, DEPRESSED OR HOPELESS: 0

## 2018-11-08 ASSESSMENT — ENCOUNTER SYMPTOMS
DIARRHEA: 0
SHORTNESS OF BREATH: 0

## 2018-11-08 NOTE — PROGRESS NOTES
Subjective:      Patient ID: Epi North is a 80 y.o. male. Chief Complaint   Patient presents with    Congestive Heart Failure   diarrhea overall better. Hx of cdiff. Seen by GI at [place]. Had tried flagyl, vancomycin, dificid. Had fecal transplant not long ago. Overall improved over approx 3 weeks. Had sigmoidoscopy while in hospital as below. Was in ER several weeks ago. Dehydrated and weak. Admitted at [facility]. Renal fxn improved. Thought to have fluid overload at rehab facility. Lasix increased. Weight improved. Still taking a lot of lasix. Up at night to urinate. Breathing ok. Appetite fair. Swelling overall better as well. Seen by cardio late august.  Due to make f/u appt. Potassium had been stopped. Now back on that. Isosorbide was stopped by cardio in august as well. Was found to have blood clots in legs on prior u/s. Improved in size. Ongoing issues w/ sig back pain. Wonder about referral for possible pain mgmt. HPI    Review of Systems   Constitutional: Negative for fever. Respiratory: Negative for shortness of breath. Cardiovascular: Negative for leg swelling. Gastrointestinal: Negative for diarrhea. Genitourinary: Positive for frequency. Objective:   Physical Exam   Constitutional: He is oriented to person, place, and time. He appears well-developed and well-nourished. HENT:   Head: Normocephalic and atraumatic. Eyes: Conjunctivae and EOM are normal.   Neck: No tracheal deviation present. Cardiovascular: Normal rate and regular rhythm. Exam reveals no gallop and no friction rub. Murmur heard. Systolic murmur is present with a grade of 2/6   Pulmonary/Chest: Effort normal and breath sounds normal.   Abdominal: Soft. There is no tenderness. Musculoskeletal: He exhibits no edema. Neurological: He is alert and oriented to person, place, and time. Skin: Skin is warm and dry. Psychiatric: He has a normal mood and affect. BP 92/74   Pulse 80   Wt 140 lb (63.5 kg)   SpO2 96%   BMI 21.93 kg/m²    Assessment/Plan   1. Paroxysmal atrial fibrillation (HCC)  Seems nsr today. F/u w/ cardio as sched. Rate controlled. - Comprehensive Metabolic Panel  - CBC Auto Differential    2. Chronic congestive heart failure, unspecified heart failure type ([facility]  bp a little low. O/w feeling better. Rpt labs. Concern for over-diuresis. 3. Colitis due to Clostridium difficile  Improved after fecal transplant. 4. Hypothyroidism, unspecified type  Rpt labs  - T4, Free  - TSH without Reflex    5. Hypomagnesemia  Rpt labs. - MAGNESIUM     6.  Cardiomyopathy  F/u w/ cardio as sched    would not recommend driving at this time. Consider formal driving eval in the future.

## 2018-11-12 ENCOUNTER — TELEPHONE (OUTPATIENT)
Dept: FAMILY MEDICINE CLINIC | Age: 83
End: 2018-11-12

## 2018-11-12 LAB
A/G RATIO: 0.9 (ref 1–2.5)
ALBUMIN SERPL-MCNC: 3.9 G/DL (ref 3.6–5)
ALP BLD-CCNC: 68 U/L (ref 46–116)
ALT SERPL-CCNC: 24 U/L (ref 12–78)
ANION GAP SERPL CALCULATED.3IONS-SCNC: 17.9 MMOL/L (ref 8–16)
AST SERPL-CCNC: 23 U/L (ref 12–36)
BASOPHILS RELATIVE PERCENT: 0.9 % (ref 0–1)
BILIRUB SERPL-MCNC: 0.6 MG/DL (ref 0–1.1)
BUN BLDV-MCNC: 92 MG/DL (ref 5–19)
CALCIUM SERPL-MCNC: 9.3 MG/DL (ref 8.4–10.2)
CHLORIDE BLD-SCNC: 96 MMOL/L (ref 99–111)
CO2: 32 MMOL/L (ref 21–33)
CREAT SERPL-MCNC: 4 MG/DL (ref 0.6–1.3)
EOSINOPHILS RELATIVE PERCENT: 3.2 % (ref 0–5)
ERYTHROCYTE DISTRIBUTION WIDTH RBC RATIO: 13.8 % (ref 11.6–14.8)
GFR CALCULATED: 14
GLOBULIN: 4.5 G/DL (ref 2–3.5)
GLUCOSE BLD-MCNC: 159 MG/DL (ref 74–99)
GRANULOCYTE ABSOLUTE COUNT: 7.9 X(10)3/UL (ref 1.8–7.2)
HCT VFR BLD CALC: 38.3 % (ref 37.4–53.8)
HEMOGLOBIN: 12.8 G/DL (ref 13.2–16.5)
IMMATURE GRANULOCYTES %: 0.2 % (ref 0–0.5)
LYMPHOCYTES ABSOLUTE: 1.2 X(10)3/UL (ref 1.1–2.7)
LYMPHOCYTES RELATIVE PERCENT: 11.2 % (ref 15–45)
MAGNESIUM: 2.1 MG/DL (ref 1.5–2.5)
MCH RBC QN AUTO: 32.7 PG (ref 27.7–33.3)
MCHC RBC AUTO-ENTMCNC: 33.4 G/DL (ref 32.8–35)
MCV RBC AUTO: 98 FL (ref 80.5–96.9)
MONOCYTES RELATIVE PERCENT: 10.7 % (ref 0–12)
NEUTROPHILS/100 LEUKOCYTES: 73.8 % (ref 40–70)
PLATELETS: 277 X1000 (ref 129–332)
POTASSIUM SERPL-SCNC: 3.2 MMOL/L (ref 3.4–5)
RBC # BLD: 3.91 X1000000 (ref 4.16–5.62)
SODIUM BLD-SCNC: 143 MMOL/L (ref 136–146)
T4 FREE: 1.28 NG/DL (ref 0.75–1.54)
TOTAL PROTEIN: 8.4 G/DL (ref 6.3–8)
TSH, 3RD GENERATION: 1.88 UIU/ML (ref 0.6–4.8)
WBC # BLD: 10.7 X1000 (ref 5.4–9.9)

## 2018-11-15 ENCOUNTER — TELEPHONE (OUTPATIENT)
Dept: FAMILY MEDICINE CLINIC | Age: 83
End: 2018-11-15

## 2018-11-15 NOTE — TELEPHONE ENCOUNTER
[facility] Initial Follow Up Call    Outreach made within 2 business days of discharge: Yes    Patient: Krsih Jennings Patient : 1934   MRN: [id number]  Reason for Admission: There are no discharge diagnoses documented for the most recent discharge. Discharge Date: 11/15/2018       Spoke with: Melissa Ortiz with [facility]    Discharge department/facility: [facility]    TCM Interactive Patient Contact:  Was patient able to fill all prescriptions: Yes  Was patient instructed to bring all medications to the follow-up visit: Yes  Is patient taking all medications as directed in the discharge summary?  Yes  Does patient understand their discharge instructions: Yes  Does patient have questions or concerns that need addressed prior to 7-14 day follow up office visit: no    Scheduled appointment with PCP within 7-14 days    Follow Up  Future Appointments  Date Time Provider Elpidio Dobson   2018 2:40 PM MD DAMON Sanders [place], [place]

## 2018-11-28 ENCOUNTER — OFFICE VISIT (OUTPATIENT)
Dept: FAMILY MEDICINE CLINIC | Age: 83
End: 2018-11-28
Payer: COMMERCIAL

## 2018-11-28 VITALS
HEIGHT: 67 IN | OXYGEN SATURATION: 90 % | DIASTOLIC BLOOD PRESSURE: 70 MMHG | WEIGHT: 149.4 LBS | SYSTOLIC BLOOD PRESSURE: 110 MMHG | HEART RATE: 87 BPM | BODY MASS INDEX: 23.45 KG/M2

## 2018-11-28 DIAGNOSIS — E86.0 DEHYDRATION: ICD-10-CM

## 2018-11-28 DIAGNOSIS — N18.4 CHRONIC KIDNEY DISEASE (CKD), STAGE IV (SEVERE) (HCC): Primary | ICD-10-CM

## 2018-11-28 DIAGNOSIS — I50.9 CHRONIC CONGESTIVE HEART FAILURE, UNSPECIFIED HEART FAILURE TYPE (HCC): ICD-10-CM

## 2018-11-28 DIAGNOSIS — I42.9 CARDIOMYOPATHY, UNSPECIFIED TYPE (HCC): ICD-10-CM

## 2018-11-28 DIAGNOSIS — I48.0 PAROXYSMAL ATRIAL FIBRILLATION (HCC): ICD-10-CM

## 2018-11-28 PROCEDURE — 1111F DSCHRG MED/CURRENT MED MERGE: CPT | Performed by: FAMILY MEDICINE

## 2018-11-28 PROCEDURE — 99495 TRANSJ CARE MGMT MOD F2F 14D: CPT | Performed by: FAMILY MEDICINE

## 2018-11-28 ASSESSMENT — ENCOUNTER SYMPTOMS
CONSTIPATION: 0
SHORTNESS OF BREATH: 0
DIARRHEA: 0

## 2018-11-28 NOTE — PROGRESS NOTES
Post-Discharge Transitional Care Management Services or Hospital Follow Up      Andrew Hensley   YOB: 1934    Date of Office Visit:  11/28/2018  Date of Hospital Admission: 11/12/18  Date of Hospital Discharge: 11/15/18  Readmission Risk Score(high >=14%.  Medium >=10%):Readmission Risk Score: 22    Care management risk score Rising risk (score 2-5) and Complex Care (Scores >=6): 7     Non face to face  following discharge, date last encounter closed (first attempt may have been earlier): 11/15/2018 10:16 AM 11/15/2018 10:16 AM    Call initiated 2 business days of discharge: Yes     Patient Active Problem List   Diagnosis    Coumadin toxicity    Atrial fibrillation ([address]    Congestive heart failure (CHF) (HCC)    Chronic kidney disease (CKD), stage IV (severe) ([address]    Gout    Chronic right-sided low back pain without sciatica    Watts's esophagus without dysplasia    Cardiomyopathy ([address]    Anxiety    Colitis due to Clostridium difficile    Diarrhea    Acute renal failure (ARF) ([address]    Hx of Clostridium difficile infection    FTT (failure to thrive) in adult    Moderate protein-calorie malnutrition ([address]    Clostridium difficile colitis    Metabolic acidosis    Recurrent Clostridium difficile diarrhea       Allergies   Allergen Reactions    Warfarin Itching       Medications listed as ordered at the time of discharge from hospital   Bernardo HERNANDEZ   Home Medication Instructions ROBERT:    Printed on:11/28/18 5188   Medication Information                      acetaminophen (TYLENOL) 325 MG tablet  Take 650 mg by mouth every 4 hours as needed for Pain             amiodarone (CORDARONE) 200 MG tablet  TAKE 1/2 TABLET BY MOUTH EVERY DAY FOR HYPERTENSION             aspirin 81 MG tablet  Take 81 mg by mouth daily             bimatoprost (LUMIGAN) 0.01 % SOLN ophthalmic drops  Place 1 drop into the left eye nightly             donepezil (ARICEPT) 5 MG tablet  Take 1 tablet by mouth by mouth daily      acetaminophen (TYLENOL) 325 MG tablet Take 650 mg by mouth every 4 hours as needed for Pain      Multiple Vitamins-Minerals (GNP MENS MULTIPLUS PO) Take by mouth      potassium chloride (KLOR-CON) 10 MEQ extended release tablet Take 1 tablet by mouth 3 times daily (Patient taking differently: Take 10 mEq by mouth 2 times daily ) 60 tablet 3    omeprazole (PRILOSEC) 20 MG delayed release capsule TAKE 1 CAPSULE BY MOUTH DAILY 30 capsule 5    vitamin B-12 (CYANOCOBALAMIN) 500 MCG tablet Take 500 mcg by mouth daily      bimatoprost (LUMIGAN) 0.01 % SOLN ophthalmic drops Place 1 drop into the left eye nightly          Medications patient taking as of now reconciled against medications ordered at time of hospital discharge: Yes    Chief Complaint   Patient presents with   [address] from [facility]- 11/12/2018-11/15/2018 - Dehydrated       HPI    Inpatient course: Discharge summary reviewed- see chart. Interval history/Current status: was recently admitted for severe dehydration and acute on chronic renal failure. Creat was 4.0 near admit. Dialysis was discussed. Has been given IV fluids. Last labs 2 days ago. Creat improved slightly to 3.8. Next appt w/ nephro approx 3 weeks from now. Has still been taking lasix 20mg bid and still taking metolazone. Had fecal transplant. Has done well. Next cardio appt after first of the yr. Last appt approx 3 mo ago. Review of Systems   Constitutional: Negative for fever. Respiratory: Negative for shortness of breath. Gastrointestinal: Negative for constipation and diarrhea. Neurological: Positive for weakness. Vitals:    11/28/18 1450   BP: 110/70   Pulse: 87   SpO2: 90%   Weight: 149 lb 6.4 oz (67.8 kg)   Height: 5' 7.01\" (1.702 m)     Body mass index is 23.39 kg/m².    Wt Readings from Last 3 Encounters:   11/28/18 149 lb 6.4 oz (67.8 kg)   11/08/18 140 lb (63.5 kg)   09/26/18 165 lb 8 oz (75.1 kg)     BP Readings

## 2018-12-03 RX ORDER — AMIODARONE HYDROCHLORIDE 200 MG/1
TABLET ORAL
Qty: 15 TABLET | Refills: 3 | Status: SHIPPED | OUTPATIENT
Start: 2018-12-03 | End: 2019-04-02 | Stop reason: SDUPTHER

## 2018-12-13 DIAGNOSIS — K22.70 BARRETT'S ESOPHAGUS WITHOUT DYSPLASIA: ICD-10-CM

## 2018-12-13 RX ORDER — OMEPRAZOLE 20 MG/1
CAPSULE, DELAYED RELEASE ORAL
Qty: 30 CAPSULE | Refills: 5 | Status: SHIPPED | OUTPATIENT
Start: 2018-12-13 | End: 2019-05-13 | Stop reason: SDUPTHER

## 2018-12-15 DIAGNOSIS — F41.9 ANXIETY: ICD-10-CM

## 2018-12-17 RX ORDER — ALPRAZOLAM 0.5 MG/1
0.5 TABLET ORAL 2 TIMES DAILY PRN
Qty: 60 TABLET | Refills: 0 | Status: SHIPPED | OUTPATIENT
Start: 2018-12-17 | End: 2019-01-16 | Stop reason: SDUPTHER

## 2018-12-24 RX ORDER — DONEPEZIL HYDROCHLORIDE 5 MG/1
5 TABLET, FILM COATED ORAL NIGHTLY
Qty: 30 TABLET | Refills: 5 | Status: SHIPPED | OUTPATIENT
Start: 2018-12-24 | End: 2019-03-05 | Stop reason: SDUPTHER

## 2019-01-04 DIAGNOSIS — I42.9 CARDIOMYOPATHY, UNSPECIFIED TYPE (HCC): ICD-10-CM

## 2019-01-04 RX ORDER — POTASSIUM CHLORIDE 750 MG/1
10 TABLET, FILM COATED, EXTENDED RELEASE ORAL 3 TIMES DAILY
Qty: 60 TABLET | Refills: 3 | Status: SHIPPED | OUTPATIENT
Start: 2019-01-04 | End: 2019-05-08 | Stop reason: SDUPTHER

## 2019-01-16 DIAGNOSIS — F41.9 ANXIETY: ICD-10-CM

## 2019-01-16 RX ORDER — ALPRAZOLAM 0.5 MG/1
0.5 TABLET ORAL 2 TIMES DAILY PRN
Qty: 60 TABLET | Refills: 0 | Status: SHIPPED | OUTPATIENT
Start: 2019-01-16 | End: 2019-02-18 | Stop reason: SDUPTHER

## 2019-02-04 DIAGNOSIS — I42.9 CARDIOMYOPATHY, UNSPECIFIED TYPE (HCC): ICD-10-CM

## 2019-02-04 RX ORDER — FUROSEMIDE 20 MG/1
TABLET ORAL
Qty: 30 TABLET | Refills: 5 | Status: SHIPPED | OUTPATIENT
Start: 2019-02-04 | End: 2019-05-15 | Stop reason: SDUPTHER

## 2019-02-11 DIAGNOSIS — I42.9 CARDIOMYOPATHY, UNSPECIFIED TYPE (HCC): ICD-10-CM

## 2019-02-18 DIAGNOSIS — F41.9 ANXIETY: ICD-10-CM

## 2019-02-18 RX ORDER — ALPRAZOLAM 0.5 MG/1
0.5 TABLET ORAL 2 TIMES DAILY PRN
Qty: 60 TABLET | Refills: 0 | Status: SHIPPED | OUTPATIENT
Start: 2019-02-18 | End: 2019-03-19 | Stop reason: SDUPTHER

## 2019-02-27 ENCOUNTER — HOSPITAL ENCOUNTER (OUTPATIENT)
Dept: OPERATING ROOM | Age: 84
Setting detail: OUTPATIENT SURGERY
Discharge: HOME | End: 2019-02-27
Payer: COMMERCIAL

## 2019-02-27 VITALS — HEART RATE: 69 BPM | SYSTOLIC BLOOD PRESSURE: 134 MMHG | DIASTOLIC BLOOD PRESSURE: 87 MMHG

## 2019-02-27 PROCEDURE — 6370000000 HC RX 637 (ALT 250 FOR IP): Performed by: OPHTHALMOLOGY

## 2019-02-27 PROCEDURE — 66821 AFTER CATARACT LASER SURGERY: CPT

## 2019-02-27 RX ORDER — PHENYLEPHRINE HCL 2.5 %
1 DROPS OPHTHALMIC (EYE)
Status: COMPLETED | OUTPATIENT
Start: 2019-02-27 | End: 2019-02-27

## 2019-02-27 RX ORDER — PROPARACAINE HYDROCHLORIDE 5 MG/ML
1 SOLUTION/ DROPS OPHTHALMIC ONCE
Status: COMPLETED | OUTPATIENT
Start: 2019-02-27 | End: 2019-02-27

## 2019-02-27 RX ORDER — PREDNISOLONE ACETATE 10 MG/ML
1 SUSPENSION/ DROPS OPHTHALMIC ONCE
Status: COMPLETED | OUTPATIENT
Start: 2019-02-27 | End: 2019-02-27

## 2019-02-27 RX ORDER — TROPICAMIDE 10 MG/ML
1 SOLUTION/ DROPS OPHTHALMIC
Status: COMPLETED | OUTPATIENT
Start: 2019-02-27 | End: 2019-02-27

## 2019-02-27 RX ADMIN — PREDNISOLONE ACETATE 1 DROP: 10 SUSPENSION/ DROPS OPHTHALMIC at 15:14

## 2019-02-27 RX ADMIN — APRACLONIDINE HYDROCHLORIDE 1 DROP: 10 SOLUTION/ DROPS OPHTHALMIC at 15:14

## 2019-02-27 RX ADMIN — PROPARACAINE HYDROCHLORIDE 1 DROP: 5 SOLUTION/ DROPS OPHTHALMIC at 15:11

## 2019-02-27 RX ADMIN — TROPICAMIDE 1 DROP: 10 SOLUTION/ DROPS OPHTHALMIC at 14:32

## 2019-02-27 RX ADMIN — TROPICAMIDE 1 DROP: 10 SOLUTION/ DROPS OPHTHALMIC at 14:25

## 2019-02-27 RX ADMIN — PHENYLEPHRINE HYDROCHLORIDE 1 DROP: 25 SOLUTION/ DROPS OPHTHALMIC at 14:25

## 2019-02-27 RX ADMIN — APRACLONIDINE HYDROCHLORIDE 1 DROP: 10 SOLUTION/ DROPS OPHTHALMIC at 14:29

## 2019-02-27 RX ADMIN — PHENYLEPHRINE HYDROCHLORIDE 1 DROP: 25 SOLUTION/ DROPS OPHTHALMIC at 14:32

## 2019-03-05 ENCOUNTER — OFFICE VISIT (OUTPATIENT)
Dept: FAMILY MEDICINE CLINIC | Age: 84
End: 2019-03-05
Payer: COMMERCIAL

## 2019-03-05 VITALS
HEIGHT: 67 IN | SYSTOLIC BLOOD PRESSURE: 92 MMHG | OXYGEN SATURATION: 90 % | DIASTOLIC BLOOD PRESSURE: 66 MMHG | HEART RATE: 88 BPM | BODY MASS INDEX: 25.11 KG/M2 | WEIGHT: 160 LBS

## 2019-03-05 DIAGNOSIS — N18.4 CHRONIC KIDNEY DISEASE (CKD), STAGE IV (SEVERE) (HCC): ICD-10-CM

## 2019-03-05 DIAGNOSIS — M21.941 ACQUIRED DEFORMITY OF RIGHT HAND: ICD-10-CM

## 2019-03-05 DIAGNOSIS — I42.9 CARDIOMYOPATHY, UNSPECIFIED TYPE (HCC): Primary | ICD-10-CM

## 2019-03-05 DIAGNOSIS — R41.3 MEMORY LOSS: ICD-10-CM

## 2019-03-05 LAB
ANION GAP SERPL CALCULATED.3IONS-SCNC: 17 MMOL/L (ref 3–16)
BASOPHILS ABSOLUTE: 0.1 K/UL (ref 0–0.2)
BASOPHILS RELATIVE PERCENT: 1 %
BUN BLDV-MCNC: 51 MG/DL (ref 7–20)
CALCIUM SERPL-MCNC: 9.3 MG/DL (ref 8.3–10.6)
CHLORIDE BLD-SCNC: 104 MMOL/L (ref 99–110)
CO2: 23 MMOL/L (ref 21–32)
CREAT SERPL-MCNC: 2.7 MG/DL (ref 0.8–1.3)
EOSINOPHILS ABSOLUTE: 0.5 K/UL (ref 0–0.6)
EOSINOPHILS RELATIVE PERCENT: 5.6 %
GFR AFRICAN AMERICAN: 27
GFR NON-AFRICAN AMERICAN: 23
GLUCOSE BLD-MCNC: 95 MG/DL (ref 70–99)
HCT VFR BLD CALC: 35.3 % (ref 40.5–52.5)
HEMOGLOBIN: 11.6 G/DL (ref 13.5–17.5)
LYMPHOCYTES ABSOLUTE: 1.1 K/UL (ref 1–5.1)
LYMPHOCYTES RELATIVE PERCENT: 13.4 %
MCH RBC QN AUTO: 32.9 PG (ref 26–34)
MCHC RBC AUTO-ENTMCNC: 32.9 G/DL (ref 31–36)
MCV RBC AUTO: 100 FL (ref 80–100)
MONOCYTES ABSOLUTE: 1 K/UL (ref 0–1.3)
MONOCYTES RELATIVE PERCENT: 12.1 %
NEUTROPHILS ABSOLUTE: 5.8 K/UL (ref 1.7–7.7)
NEUTROPHILS RELATIVE PERCENT: 67.9 %
PDW BLD-RTO: 15.6 % (ref 12.4–15.4)
PLATELET # BLD: 252 K/UL (ref 135–450)
PMV BLD AUTO: 8.6 FL (ref 5–10.5)
POTASSIUM SERPL-SCNC: 4.6 MMOL/L (ref 3.5–5.1)
RBC # BLD: 3.53 M/UL (ref 4.2–5.9)
SODIUM BLD-SCNC: 144 MMOL/L (ref 136–145)
WBC # BLD: 8.5 K/UL (ref 4–11)

## 2019-03-05 PROCEDURE — 99213 OFFICE O/P EST LOW 20 MIN: CPT | Performed by: FAMILY MEDICINE

## 2019-03-05 PROCEDURE — 36415 COLL VENOUS BLD VENIPUNCTURE: CPT | Performed by: FAMILY MEDICINE

## 2019-03-05 RX ORDER — DONEPEZIL HYDROCHLORIDE 10 MG/1
10 TABLET, FILM COATED ORAL NIGHTLY
Qty: 30 TABLET | Refills: 5 | Status: SHIPPED | OUTPATIENT
Start: 2019-03-05 | End: 2019-08-26 | Stop reason: SDUPTHER

## 2019-03-05 ASSESSMENT — PATIENT HEALTH QUESTIONNAIRE - PHQ9
SUM OF ALL RESPONSES TO PHQ QUESTIONS 1-9: 0
SUM OF ALL RESPONSES TO PHQ9 QUESTIONS 1 & 2: 0
SUM OF ALL RESPONSES TO PHQ QUESTIONS 1-9: 0
1. LITTLE INTEREST OR PLEASURE IN DOING THINGS: 0
2. FEELING DOWN, DEPRESSED OR HOPELESS: 0

## 2019-03-05 ASSESSMENT — ENCOUNTER SYMPTOMS: SHORTNESS OF BREATH: 0

## 2019-03-08 RX ORDER — MIRTAZAPINE 30 MG/1
30 TABLET, FILM COATED ORAL NIGHTLY
Qty: 30 TABLET | Refills: 0 | Status: SHIPPED | OUTPATIENT
Start: 2019-03-08 | End: 2019-04-09 | Stop reason: SDUPTHER

## 2019-03-14 RX ORDER — APIXABAN 5 MG/1
TABLET, FILM COATED ORAL
Qty: 60 TABLET | Refills: 3 | Status: SHIPPED | OUTPATIENT
Start: 2019-03-14 | End: 2019-07-05 | Stop reason: SDUPTHER

## 2019-03-19 DIAGNOSIS — F41.9 ANXIETY: ICD-10-CM

## 2019-03-19 RX ORDER — ALPRAZOLAM 0.5 MG/1
0.5 TABLET ORAL 2 TIMES DAILY PRN
Qty: 60 TABLET | Refills: 0 | Status: SHIPPED | OUTPATIENT
Start: 2019-03-19 | End: 2019-04-18 | Stop reason: SDUPTHER

## 2019-04-09 RX ORDER — MIRTAZAPINE 30 MG/1
30 TABLET, FILM COATED ORAL NIGHTLY
Qty: 30 TABLET | Refills: 5 | Status: SHIPPED | OUTPATIENT
Start: 2019-04-09 | End: 2019-09-24 | Stop reason: SDUPTHER

## 2019-04-18 ENCOUNTER — OFFICE VISIT (OUTPATIENT)
Dept: FAMILY MEDICINE CLINIC | Age: 84
End: 2019-04-18
Payer: COMMERCIAL

## 2019-04-18 VITALS
BODY MASS INDEX: 25.39 KG/M2 | WEIGHT: 161.8 LBS | OXYGEN SATURATION: 98 % | DIASTOLIC BLOOD PRESSURE: 64 MMHG | SYSTOLIC BLOOD PRESSURE: 102 MMHG | HEART RATE: 70 BPM | HEIGHT: 67 IN

## 2019-04-18 DIAGNOSIS — F41.9 ANXIETY: ICD-10-CM

## 2019-04-18 DIAGNOSIS — I48.0 PAROXYSMAL ATRIAL FIBRILLATION (HCC): ICD-10-CM

## 2019-04-18 DIAGNOSIS — R41.3 MEMORY LOSS: ICD-10-CM

## 2019-04-18 DIAGNOSIS — N18.4 CHRONIC KIDNEY DISEASE (CKD), STAGE IV (SEVERE) (HCC): Primary | ICD-10-CM

## 2019-04-18 DIAGNOSIS — I25.5 ISCHEMIC CARDIOMYOPATHY: ICD-10-CM

## 2019-04-18 PROCEDURE — 99213 OFFICE O/P EST LOW 20 MIN: CPT | Performed by: FAMILY MEDICINE

## 2019-04-18 RX ORDER — ALPRAZOLAM 0.5 MG/1
0.5 TABLET ORAL 2 TIMES DAILY PRN
Qty: 60 TABLET | Refills: 0 | Status: SHIPPED | OUTPATIENT
Start: 2019-04-18 | End: 2019-05-14 | Stop reason: SDUPTHER

## 2019-04-18 ASSESSMENT — ENCOUNTER SYMPTOMS
CONSTIPATION: 0
DIARRHEA: 0
SHORTNESS OF BREATH: 1

## 2019-04-18 NOTE — TELEPHONE ENCOUNTER
Patient last seen on 3/5/19. Advised to follow up 6 weeks. Next appointment 4/18/19.      OARRS 2/18/19

## 2019-04-18 NOTE — PROGRESS NOTES
Chief Complaint   Patient presents with    Memory Loss       HPI:  Tiffany Willis is a 80 y.o. (: 1934) here today   for   HPI  Follow up on memory loss, increases Aricept at last visit. Wife feels that it has made some difference. Patient feels as though memory has been stable. No SE noted w/ inc dose. Memory feels stable. Had ECHO and appt with Dr. More Montanez and was told echo was ok. Mod TR and MR. Mild AS. Will need pacer/defib. Considering bi-ventricular pacer. Seen by cardio yesterday    Still some sxs to right hand. Has not been specialist.      Has appt w/ nephro later this mo. Patient's medications, allergies, past medical, surgical, social and family histories were reviewed and updated as appropriate. ROS:  Review of Systems   Constitutional: Negative for fever. Respiratory: Positive for shortness of breath (w/ exertion). Cardiovascular: Positive for leg swelling (improved). Gastrointestinal: Negative for constipation and diarrhea. Neurological:        Memory loss.              Microscopic Examination (no units)   Date Value   2018 Not Indicated     LDL Calculated (mg/dL)   Date Value   2018 84       Past Medical History:   Diagnosis Date    Arthritis     Atrial fibrillation (HCC) atrial fibrillation, pacer    Cardiomyopathy ([facility]     Cataract     Chronic kidney disease (CKD), stage IV (severe) (HCC)     Clostridium difficile infection 2018    Congestive heart failure (CHF) (HCC)     Gout        Family History   Problem Relation Age of Onset    Heart Disease Mother         abn valve    Coronary Art Dis Father          of MI    Stroke Sister     Hypertension Sister     Heart Attack Son 52    Hypertension Grandchild        Social History     Socioeconomic History    Marital status:      Spouse name: Not on file    Number of children: Not on file    Years of education: Not on file    Highest education level: Not on file extended release tablet TAKE 1 TABLET BY MOUTH 3 TIMES DAILY  Patient taking differently: Take 10 mEq by mouth 2 times daily  Yes Devyn Church MD   omeprazole (PRILOSEC) 20 MG delayed release capsule TAKE 1 CAPSULE BY MOUTH DAILY Yes HERNESTO Ocampo CNP   levothyroxine (SYNTHROID) 25 MCG tablet TAKE 1 TABLET BY MOUTH EVERY DAY Yes Devyn Church MD   Probiotic Product (PROBIOTIC ADVANCED PO) Take 1 tablet by mouth daily Yes Historical Provider, MD   aspirin 81 MG tablet Take 81 mg by mouth daily Yes Historical Provider, MD   acetaminophen (TYLENOL) 325 MG tablet Take 650 mg by mouth every 4 hours as needed for Pain Yes Historical Provider, MD   Multiple Vitamins-Minerals (GNP MENS MULTIPLUS PO) Take by mouth Yes Historical Provider, MD   vitamin B-12 (CYANOCOBALAMIN) 500 MCG tablet Take 500 mcg by mouth daily Yes Historical Provider, MD   ALPRAZolam Vernneka Raymond) 0.5 MG tablet Take 1 tablet by mouth 2 times daily as needed for Sleep or Anxiety for up to 30 days. Devyn Church MD       Allergies   Allergen Reactions    Warfarin Itching       OBJECTIVE:    /64   Pulse 70   Ht 5' 7.01\" (1.702 m)   Wt 161 lb 12.8 oz (73.4 kg)   SpO2 98%   BMI 25.33 kg/m²     BP Readings from Last 2 Encounters:   04/18/19 102/64   03/05/19 92/66       Wt Readings from Last 3 Encounters:   04/18/19 161 lb 12.8 oz (73.4 kg)   03/05/19 160 lb (72.6 kg)   11/28/18 149 lb 6.4 oz (67.8 kg)       Physical Exam   Constitutional: He is oriented to person, place, and time. He appears well-developed and well-nourished. HENT:   Head: Normocephalic and atraumatic. Eyes: Conjunctivae and EOM are normal.   Neck: No tracheal deviation present. Cardiovascular: Normal rate and regular rhythm. Exam reveals no gallop and no friction rub. Murmur heard. Systolic murmur is present with a grade of 2/6. Pulmonary/Chest: Effort normal and breath sounds normal.   Abdominal: Soft. There is no tenderness.    Musculoskeletal: He exhibits edema (1+). Neurological: He is alert and oriented to person, place, and time. Skin: Skin is warm and dry. Psychiatric: He has a normal mood and affect. ASSESSMENT/PLAN:    1. Chronic kidney disease (CKD), stage IV (severe) (HCC)  F/u w/ nephro as sched and labs as planned. 2. Ischemic cardiomyopathy  Reviewed prior cardio appt. Cont meds. Overall doing better    3. Paroxysmal atrial fibrillation (HCC)  Seems to be in nsr today. The current medical regimen is effective;  continue present plan and medications. 4. Memory loss  Cont aricept. Seems to have helped.   Still waiting on info re driving

## 2019-05-02 RX ORDER — LEVOTHYROXINE SODIUM 0.03 MG/1
TABLET ORAL
Qty: 30 TABLET | Refills: 11 | Status: SHIPPED | OUTPATIENT
Start: 2019-05-02 | End: 2020-04-13

## 2019-05-08 DIAGNOSIS — I42.9 CARDIOMYOPATHY, UNSPECIFIED TYPE (HCC): ICD-10-CM

## 2019-05-08 RX ORDER — POTASSIUM CHLORIDE 750 MG/1
10 TABLET, FILM COATED, EXTENDED RELEASE ORAL 2 TIMES DAILY
Qty: 60 TABLET | Refills: 1 | Status: SHIPPED | OUTPATIENT
Start: 2019-05-08 | End: 2019-06-29 | Stop reason: SDUPTHER

## 2019-05-13 DIAGNOSIS — K22.70 BARRETT'S ESOPHAGUS WITHOUT DYSPLASIA: ICD-10-CM

## 2019-05-13 RX ORDER — OMEPRAZOLE 20 MG/1
CAPSULE, DELAYED RELEASE ORAL
Qty: 30 CAPSULE | Refills: 5 | Status: SHIPPED | OUTPATIENT
Start: 2019-05-13 | End: 2019-12-02 | Stop reason: SDUPTHER

## 2019-05-14 DIAGNOSIS — F41.9 ANXIETY: ICD-10-CM

## 2019-05-14 RX ORDER — ALPRAZOLAM 0.5 MG/1
0.5 TABLET ORAL 2 TIMES DAILY PRN
Qty: 60 TABLET | Refills: 0 | Status: SHIPPED | OUTPATIENT
Start: 2019-05-14 | End: 2019-06-10 | Stop reason: SDUPTHER

## 2019-05-15 DIAGNOSIS — I42.9 CARDIOMYOPATHY, UNSPECIFIED TYPE (HCC): ICD-10-CM

## 2019-05-15 RX ORDER — FUROSEMIDE 20 MG/1
TABLET ORAL
Qty: 30 TABLET | Refills: 5 | Status: SHIPPED | OUTPATIENT
Start: 2019-05-15 | End: 2020-06-18

## 2019-06-05 DIAGNOSIS — I42.9 CARDIOMYOPATHY, UNSPECIFIED TYPE (HCC): ICD-10-CM

## 2019-06-10 DIAGNOSIS — F41.9 ANXIETY: ICD-10-CM

## 2019-06-10 NOTE — TELEPHONE ENCOUNTER
Date of last refill of this med was 5/14/19, # of pills given 60 and # of refills given 0. Their next appointment is not scheduled, the last date patient was seen was 4/18/19. Does patient have medication agreement on file? Yes  Has drug screen been done in last 12 months if needed?  no

## 2019-06-12 RX ORDER — ALPRAZOLAM 0.5 MG/1
0.5 TABLET ORAL 2 TIMES DAILY PRN
Qty: 60 TABLET | Refills: 0 | Status: SHIPPED | OUTPATIENT
Start: 2019-06-12 | End: 2019-07-19 | Stop reason: SDUPTHER

## 2019-06-29 DIAGNOSIS — I42.9 CARDIOMYOPATHY, UNSPECIFIED TYPE (HCC): ICD-10-CM

## 2019-07-01 RX ORDER — POTASSIUM CHLORIDE 750 MG/1
10 TABLET, FILM COATED, EXTENDED RELEASE ORAL 2 TIMES DAILY
Qty: 60 TABLET | Refills: 1 | Status: SHIPPED | OUTPATIENT
Start: 2019-07-01 | End: 2019-08-26 | Stop reason: SDUPTHER

## 2019-07-05 RX ORDER — APIXABAN 5 MG/1
TABLET, FILM COATED ORAL
Qty: 60 TABLET | Refills: 3 | Status: SHIPPED | OUTPATIENT
Start: 2019-07-05 | End: 2019-10-25 | Stop reason: SDUPTHER

## 2019-07-19 DIAGNOSIS — F41.9 ANXIETY: ICD-10-CM

## 2019-07-22 RX ORDER — ALPRAZOLAM 0.5 MG/1
0.5 TABLET ORAL 2 TIMES DAILY PRN
Qty: 60 TABLET | Refills: 0 | Status: SHIPPED | OUTPATIENT
Start: 2019-07-22 | End: 2019-08-21 | Stop reason: SDUPTHER

## 2019-07-29 RX ORDER — AMIODARONE HYDROCHLORIDE 200 MG/1
TABLET ORAL
Qty: 15 TABLET | Refills: 3 | Status: SHIPPED | OUTPATIENT
Start: 2019-07-29 | End: 2019-11-18 | Stop reason: SDUPTHER

## 2019-08-06 ENCOUNTER — OFFICE VISIT (OUTPATIENT)
Dept: FAMILY MEDICINE CLINIC | Age: 84
End: 2019-08-06
Payer: COMMERCIAL

## 2019-08-06 VITALS
HEART RATE: 85 BPM | HEIGHT: 67 IN | WEIGHT: 160.8 LBS | OXYGEN SATURATION: 98 % | SYSTOLIC BLOOD PRESSURE: 120 MMHG | RESPIRATION RATE: 14 BRPM | DIASTOLIC BLOOD PRESSURE: 80 MMHG | BODY MASS INDEX: 25.24 KG/M2

## 2019-08-06 DIAGNOSIS — Z00.00 ROUTINE GENERAL MEDICAL EXAMINATION AT A HEALTH CARE FACILITY: Primary | ICD-10-CM

## 2019-08-06 PROCEDURE — G0439 PPPS, SUBSEQ VISIT: HCPCS | Performed by: FAMILY MEDICINE

## 2019-08-06 ASSESSMENT — PATIENT HEALTH QUESTIONNAIRE - PHQ9
SUM OF ALL RESPONSES TO PHQ QUESTIONS 1-9: 0
SUM OF ALL RESPONSES TO PHQ QUESTIONS 1-9: 0

## 2019-08-06 ASSESSMENT — LIFESTYLE VARIABLES: HOW OFTEN DO YOU HAVE A DRINK CONTAINING ALCOHOL: 0

## 2019-08-06 NOTE — PROGRESS NOTES
Medicare Annual Wellness Visit  Name: Alisia Thibodeaux Date: 2019   MRN: [id number] Sex: Male   Age: 80 y.o. Ethnicity: Non-/Non    : 1934 Race: Karlee Gamez is here for Medicare AWV    Screenings for behavioral, psychosocial and functional/safety risks, and cognitive dysfunction are all negative except as indicated below. These results, as well as other patient data from the [address] form, are documented in Flowsheets linked to this Encounter. Allergies   Allergen Reactions    Warfarin Itching     Prior to Visit Medications    Medication Sig Taking? Authorizing Provider   amiodarone (CORDARONE) 200 MG tablet TAKE 1/2 TABLETS BY MOUTH DAILY Yes HERNESTO Marie - MARIA INES   ALPRAZolam Tawana Scales) 0.5 MG tablet Take 1 tablet by mouth 2 times daily as needed for Sleep or Anxiety for up to 30 days.  Yes Rach Jerez MD   ELIQUIS 5 MG TABS tablet TAKE 1 TABLET BY MOUTH 2 TIMES DAILY Yes HERNESTO Marie - CNP   potassium chloride (KLOR-CON) 10 MEQ extended release tablet TAKE 1 TABLET BY MOUTH 2 TIMES DAILY Yes Cleopatra Nicole APRN - MARIA INES   metoprolol tartrate (LOPRESSOR) 25 MG tablet TAKE 1 TABLET BY MOUTH 2 TIMES DAILY Yes Cleopatra Nicole APRN - CNP   furosemide (LASIX) 20 MG tablet TAKE 2 TABLETS BY MOUTH EVERY DAY Yes Rach Jerez MD   omeprazole (PRILOSEC) 20 MG delayed release capsule TAKE 1 CAPSULE BY MOUTH DAILY Yes Rach Jerez MD   levothyroxine (SYNTHROID) 25 MCG tablet TAKE 1 TABLET BY MOUTH EVERY DAY Yes Rach Jerez MD   sacubitril-valsartan (ENTRESTO) 24-26 MG per tablet Take 1 tablet by mouth 2 times daily Yes Historical Provider, MD   donepezil (ARICEPT) 10 MG tablet Take 1 tablet by mouth nightly Yes Rach Jerez MD   Probiotic Product (PROBIOTIC ADVANCED PO) Take 1 tablet by mouth daily Yes Historical Provider, MD   aspirin 81 MG tablet Take 81 mg by mouth daily Yes Historical Provider, MD   acetaminophen (TYLENOL) 325 MG

## 2019-08-06 NOTE — PATIENT INSTRUCTIONS
memory aids can help:   Calendars and day planners   Electronic organizers to store all sorts of helpful informationThese devices can \"beep\" to remind you of appointments. A book of days to record birthdays, anniversaries, and other occasions that occur on the same date every year   Detailed \"to-do\" lists and strategically placed sticky notes   Quick \"study\" sessionsBefore a gathering, review who will be there so their names will be fresh in your mind. Establish routinesFor example, keep your keys, wallet, and umbrella in the same place all the time or take medicine with your 8:00 AM glass of juice   Live a Healthy Life   Many actions that will keep your body strong will do the same for your mind. For example:   Talk to Your Doctor About Herbs and Supplements    Malnutrition and vitamin deficiencies can impair your mental function. For example, vitamin B12 deficiency can cause a range of symptoms, including confusion. But, what if your nutritional needs are being met? Can herbs and supplements still offer a benefit? Researchers have investigated a range of natural remedies, such as ginkgo , ginseng , and the supplement phosphatidylserine (PS). So far, though, the evidence is inconsistent as to whether these products can improve memory or thinking. If you are interested in taking herbs and supplements, talk to your doctor first because they may interact with other medicines that you are taking. Exercise Regularly    Among the many benefits of regular exercise are increased blood flow to the brain and decreased risk of certain diseases that can interfere with memory function. One study found that even moderate exercise has a beneficial effect. Examples of \"moderate\" exercise include:   Playing 18 holes of golf once a week, without a cart   Playing tennis twice a week   Walking one mile per day   Manage Stress    It can be tough to remember what is important when your mind is cluttered.  Make time for wood floors can be particularly slippery. Stairs should always have handrails and be carpeted or fitted with a non-skid tread. Is your telephone easily reachable. Is the cord safely tucked away? Room by Room   According to the Association of Aging, bathrooms and thi are the two most potentially hazardous rooms in your home. In the Kitchen    Be sure your stove is in proper working order and always make sure burners and the oven are off before you go out or go to sleep. Keep pots on the back burners, turn handles away from the front of the stove, and keep stove clean and free of grease build-up. Kitchen ventilation systems and range exhausts should be working properly. Keep flammable objects such as towels and pot holders away from the cooking area except when in use. Make sure kitchen curtains are tied back. Move cords and appliances away from the sink and hot surfaces. If extension cords are needed, install wiring guides so they do not hang over the sink, range, or working areas. Look for coffee pots, kettles and toaster ovens with automatic shut-offs. Keep a mop handy in the kitchen so you can wipe up spills instantly. You should also have a small fire extinguisher. Arrange your kitchen with frequently used items on lower shelves to avoid the need to stand on a stepstool to reach them. Make sure countertops are well-lit to avoid injuries while cutting and preparing food. In the Bathroom    Use a non-slip mat or decals in the tub and shower, since wet, soapy tile or porcelain surfaces are extremely slippery. Make sure bathroom rugs are non-skid or tape them firmly to the floor. Bathtubs should have at least one, preferably two, grab bars, firmly attached to structural supports in the wall. (Do not use built-in soap holders or glass shower doors as grab bars.)    Tub seats fitted with non-slip material on the legs allow you to wash sitting down.  For people with limited

## 2019-08-21 DIAGNOSIS — F41.9 ANXIETY: ICD-10-CM

## 2019-08-21 RX ORDER — ALPRAZOLAM 0.5 MG/1
0.5 TABLET ORAL 2 TIMES DAILY PRN
Qty: 60 TABLET | Refills: 0 | Status: SHIPPED | OUTPATIENT
Start: 2019-08-21 | End: 2019-09-21 | Stop reason: SDUPTHER

## 2019-08-26 DIAGNOSIS — R41.3 MEMORY LOSS: ICD-10-CM

## 2019-08-26 DIAGNOSIS — I42.9 CARDIOMYOPATHY, UNSPECIFIED TYPE (HCC): ICD-10-CM

## 2019-08-26 RX ORDER — DONEPEZIL HYDROCHLORIDE 10 MG/1
10 TABLET, FILM COATED ORAL NIGHTLY
Qty: 30 TABLET | Refills: 5 | Status: SHIPPED | OUTPATIENT
Start: 2019-08-26 | End: 2020-02-11

## 2019-08-26 RX ORDER — POTASSIUM CHLORIDE 750 MG/1
10 TABLET, FILM COATED, EXTENDED RELEASE ORAL 2 TIMES DAILY
Qty: 60 TABLET | Refills: 5 | Status: SHIPPED | OUTPATIENT
Start: 2019-08-26 | End: 2020-06-18

## 2019-09-21 DIAGNOSIS — F41.9 ANXIETY: ICD-10-CM

## 2019-09-23 RX ORDER — ALPRAZOLAM 0.5 MG/1
0.5 TABLET ORAL 2 TIMES DAILY PRN
Qty: 60 TABLET | Refills: 0 | Status: SHIPPED | OUTPATIENT
Start: 2019-09-23 | End: 2019-10-23 | Stop reason: SDUPTHER

## 2019-09-25 RX ORDER — MIRTAZAPINE 30 MG/1
30 TABLET, FILM COATED ORAL NIGHTLY
Qty: 30 TABLET | Refills: 5 | Status: SHIPPED | OUTPATIENT
Start: 2019-09-25 | End: 2020-03-17

## 2019-10-14 ENCOUNTER — TELEPHONE (OUTPATIENT)
Dept: FAMILY MEDICINE CLINIC | Age: 84
End: 2019-10-14

## 2019-10-23 ENCOUNTER — TELEPHONE (OUTPATIENT)
Dept: FAMILY MEDICINE CLINIC | Age: 84
End: 2019-10-23

## 2019-10-23 DIAGNOSIS — F41.9 ANXIETY: ICD-10-CM

## 2019-10-23 RX ORDER — ALPRAZOLAM 0.5 MG/1
0.5 TABLET ORAL 2 TIMES DAILY PRN
Qty: 60 TABLET | Refills: 0 | Status: SHIPPED | OUTPATIENT
Start: 2019-10-23 | End: 2019-11-25 | Stop reason: SDUPTHER

## 2019-10-25 RX ORDER — APIXABAN 5 MG/1
TABLET, FILM COATED ORAL
Qty: 60 TABLET | Refills: 3 | Status: SHIPPED | OUTPATIENT
Start: 2019-10-25 | End: 2020-02-17

## 2019-11-18 RX ORDER — AMIODARONE HYDROCHLORIDE 200 MG/1
TABLET ORAL
Qty: 15 TABLET | Refills: 3 | Status: SHIPPED | OUTPATIENT
Start: 2019-11-18 | End: 2020-03-13

## 2019-11-25 DIAGNOSIS — F41.9 ANXIETY: ICD-10-CM

## 2019-11-25 RX ORDER — ALPRAZOLAM 0.5 MG/1
0.5 TABLET ORAL 2 TIMES DAILY PRN
Qty: 60 TABLET | Refills: 0 | Status: SHIPPED | OUTPATIENT
Start: 2019-11-25 | End: 2019-12-26

## 2019-12-02 DIAGNOSIS — K22.70 BARRETT'S ESOPHAGUS WITHOUT DYSPLASIA: ICD-10-CM

## 2019-12-02 RX ORDER — OMEPRAZOLE 20 MG/1
CAPSULE, DELAYED RELEASE ORAL
Qty: 30 CAPSULE | Refills: 5 | Status: SHIPPED | OUTPATIENT
Start: 2019-12-02 | End: 2020-05-26

## 2019-12-26 DIAGNOSIS — F41.9 ANXIETY: ICD-10-CM

## 2019-12-26 RX ORDER — ALPRAZOLAM 0.5 MG/1
0.5 TABLET ORAL 2 TIMES DAILY PRN
Qty: 60 TABLET | Refills: 0 | Status: SHIPPED | OUTPATIENT
Start: 2019-12-26 | End: 2020-01-27

## 2020-01-27 RX ORDER — ALPRAZOLAM 0.5 MG/1
0.5 TABLET ORAL 2 TIMES DAILY PRN
Qty: 60 TABLET | Refills: 0 | Status: SHIPPED | OUTPATIENT
Start: 2020-01-27 | End: 2020-03-27

## 2020-02-11 RX ORDER — DONEPEZIL HYDROCHLORIDE 10 MG/1
10 TABLET, FILM COATED ORAL NIGHTLY
Qty: 30 TABLET | Refills: 4 | Status: SHIPPED | OUTPATIENT
Start: 2020-02-11 | End: 2020-06-18 | Stop reason: DRUGHIGH

## 2020-02-17 RX ORDER — APIXABAN 5 MG/1
TABLET, FILM COATED ORAL
Qty: 60 TABLET | Refills: 2 | Status: SHIPPED | OUTPATIENT
Start: 2020-02-17 | End: 2020-07-20

## 2020-03-13 RX ORDER — AMIODARONE HYDROCHLORIDE 200 MG/1
TABLET ORAL
Qty: 15 TABLET | Refills: 2 | Status: SHIPPED | OUTPATIENT
Start: 2020-03-13 | End: 2020-07-29

## 2020-03-17 RX ORDER — MIRTAZAPINE 30 MG/1
30 TABLET, FILM COATED ORAL NIGHTLY
Qty: 30 TABLET | Refills: 4 | Status: SHIPPED | OUTPATIENT
Start: 2020-03-17 | End: 2020-10-07

## 2020-03-27 RX ORDER — ALPRAZOLAM 0.5 MG/1
0.5 TABLET ORAL 2 TIMES DAILY PRN
Qty: 60 TABLET | Refills: 0 | Status: SHIPPED | OUTPATIENT
Start: 2020-03-27 | End: 2020-05-28

## 2020-03-27 NOTE — TELEPHONE ENCOUNTER
Date of last refill of this med was 12/26/2019, # of pills given 60 and # of refills given 0. Their next appointment is none, the last date patient was seen was 04/18/2019. Does patient have medication agreement on file? Yes  Has drug screen been done in last 12 months if needed?  no

## 2020-05-26 RX ORDER — OMEPRAZOLE 20 MG/1
CAPSULE, DELAYED RELEASE ORAL
Qty: 30 CAPSULE | Refills: 11 | Status: SHIPPED | OUTPATIENT
Start: 2020-05-26

## 2020-06-18 ENCOUNTER — OFFICE VISIT (OUTPATIENT)
Dept: FAMILY MEDICINE CLINIC | Age: 85
End: 2020-06-18
Payer: COMMERCIAL

## 2020-06-18 ENCOUNTER — TELEPHONE (OUTPATIENT)
Dept: ADMINISTRATIVE | Age: 85
End: 2020-06-18

## 2020-06-18 VITALS
SYSTOLIC BLOOD PRESSURE: 118 MMHG | BODY MASS INDEX: 26.15 KG/M2 | HEART RATE: 60 BPM | TEMPERATURE: 98.2 F | OXYGEN SATURATION: 97 % | DIASTOLIC BLOOD PRESSURE: 86 MMHG | HEIGHT: 67 IN | WEIGHT: 166.6 LBS

## 2020-06-18 PROCEDURE — 99214 OFFICE O/P EST MOD 30 MIN: CPT | Performed by: FAMILY MEDICINE

## 2020-06-18 RX ORDER — DONEPEZIL HYDROCHLORIDE 23 MG/1
23 TABLET, FILM COATED ORAL NIGHTLY
Qty: 30 TABLET | Refills: 3 | Status: SHIPPED | OUTPATIENT
Start: 2020-06-18 | End: 2020-11-02

## 2020-06-18 RX ORDER — ASPIRIN 325 MG
325 TABLET ORAL DAILY
COMMUNITY

## 2020-06-18 RX ORDER — FERROUS SULFATE 325(65) MG
325 TABLET ORAL
COMMUNITY
End: 2020-11-04

## 2020-06-18 ASSESSMENT — ENCOUNTER SYMPTOMS
BACK PAIN: 1
SHORTNESS OF BREATH: 0
DIARRHEA: 1
CONSTIPATION: 1

## 2020-06-18 ASSESSMENT — PATIENT HEALTH QUESTIONNAIRE - PHQ9
SUM OF ALL RESPONSES TO PHQ9 QUESTIONS 1 & 2: 0
SUM OF ALL RESPONSES TO PHQ QUESTIONS 1-9: 0
2. FEELING DOWN, DEPRESSED OR HOPELESS: 0
SUM OF ALL RESPONSES TO PHQ QUESTIONS 1-9: 0
1. LITTLE INTEREST OR PLEASURE IN DOING THINGS: 0

## 2020-06-18 NOTE — PROGRESS NOTES
w/dual-chamber pacemaker pulse generator/TCH/Dr Adeel Mcknight Gout        Family History   Problem Relation Age of Onset    Heart Disease Mother         abn valve    Coronary Art Dis Father          of MI    Stroke Sister     Hypertension Sister     Heart Attack Son 52    Hypertension Grandchild        Social History     Socioeconomic History    Marital status:      Spouse name: Not on file    Number of children: Not on file    Years of education: Not on file    Highest education level: Not on file   Occupational History    Not on file   Social Needs    Financial resource strain: Not on file    Food insecurity     Worry: Not on file     Inability: Not on file    Transportation needs     Medical: Not on file     Non-medical: Not on file   Tobacco Use    Smoking status: Never Smoker    Smokeless tobacco: Never Used   Substance and Sexual Activity    Alcohol use: No    Drug use: No    Sexual activity: Yes     Partners: Female   Lifestyle    Physical activity     Days per week: Not on file     Minutes per session: Not on file    Stress: Not on file   Relationships    Social connections     Talks on phone: Not on file     Gets together: Not on file     Attends Judaism service: Not on file     Active member of club or organization: Not on file     Attends meetings of clubs or organizations: Not on file     Relationship status: Not on file    Intimate partner violence     Fear of current or ex partner: Not on file     Emotionally abused: Not on file     Physically abused: Not on file     Forced sexual activity: Not on file   Other Topics Concern    Not on file   Social History Narrative    Not on file       Prior to Visit Medications    Medication Sig Taking?  Authorizing Provider   aspirin 325 MG tablet Take 325 mg by mouth daily Yes Historical Provider, MD   diphenhydrAMINE-APAP, sleep, (TYLENOL PM EXTRA STRENGTH PO) Take by mouth Yes Historical Provider, MD   ferrous sulfate (IRON 325)

## 2020-07-20 RX ORDER — APIXABAN 5 MG/1
TABLET, FILM COATED ORAL
Qty: 60 TABLET | Refills: 11 | Status: SHIPPED | OUTPATIENT
Start: 2020-07-20

## 2020-07-29 RX ORDER — AMIODARONE HYDROCHLORIDE 200 MG/1
TABLET ORAL
Qty: 15 TABLET | Refills: 5 | Status: SHIPPED | OUTPATIENT
Start: 2020-07-29 | End: 2021-01-29

## 2020-07-29 RX ORDER — ALPRAZOLAM 0.5 MG/1
0.5 TABLET ORAL 2 TIMES DAILY PRN
Qty: 60 TABLET | Refills: 0 | Status: SHIPPED | OUTPATIENT
Start: 2020-07-29 | End: 2020-09-28

## 2020-08-03 RX ORDER — LEVOTHYROXINE SODIUM 0.03 MG/1
TABLET ORAL
Qty: 30 TABLET | Refills: 3 | Status: SHIPPED | OUTPATIENT
Start: 2020-08-03 | End: 2020-12-09

## 2020-08-06 ENCOUNTER — NURSE ONLY (OUTPATIENT)
Dept: FAMILY MEDICINE CLINIC | Age: 85
End: 2020-08-06
Payer: COMMERCIAL

## 2020-08-06 LAB
T4 FREE: 1.3 NG/DL (ref 0.9–1.8)
TSH SERPL DL<=0.05 MIU/L-ACNC: 2.61 UIU/ML (ref 0.27–4.2)

## 2020-08-06 PROCEDURE — 36415 COLL VENOUS BLD VENIPUNCTURE: CPT | Performed by: FAMILY MEDICINE

## 2020-08-06 NOTE — PROGRESS NOTES
Blood drawn per order. Needle size: 23 g  Site: Right Hand. First attempt successful No    Second attempt yes    Pressure applied until bleeding stopped. Yes applied. Patient informed to call office or return if bleeding reoccurs and unable to stop.     Tubes drawn: 0 purple     1 red

## 2020-09-28 RX ORDER — ALPRAZOLAM 0.5 MG/1
0.5 TABLET ORAL 2 TIMES DAILY PRN
Qty: 60 TABLET | Refills: 0 | Status: SHIPPED | OUTPATIENT
Start: 2020-09-28 | End: 2020-10-29

## 2020-10-07 RX ORDER — MIRTAZAPINE 30 MG/1
30 TABLET, FILM COATED ORAL NIGHTLY
Qty: 30 TABLET | Refills: 4 | Status: SHIPPED | OUTPATIENT
Start: 2020-10-07 | End: 2021-03-09

## 2020-10-29 RX ORDER — ALPRAZOLAM 0.5 MG/1
0.5 TABLET ORAL 2 TIMES DAILY PRN
Qty: 60 TABLET | Refills: 0 | Status: SHIPPED | OUTPATIENT
Start: 2020-10-29 | End: 2020-11-30

## 2020-10-29 NOTE — TELEPHONE ENCOUNTER
Date of last refill of this med was 9/28/2020, # of pills given 60 and # of refills given 0. Their next appointment is 0, the last date patient was seen was 6/18/2020. Does patient have medication agreement on file? No  Has drug screen been done in last 12 months if needed?  no

## 2020-10-29 NOTE — LETTER
[address]  [address] [facility]  Phone: [phone]  Fax: [phone]    Sathish Iniguez MD        October 29, 2020    [address]  [address] [facility]        Dear Samra Crisostomo: We recently received a refill request for your medications and upon review you are due for a routine check up. Please contact our office to schedule. If you have any questions or concerns, please don't hesitate to call.       Sincerely,        MEGHA Michele

## 2020-11-04 ENCOUNTER — OFFICE VISIT (OUTPATIENT)
Dept: FAMILY MEDICINE CLINIC | Age: 85
End: 2020-11-04
Payer: COMMERCIAL

## 2020-11-04 VITALS
BODY MASS INDEX: 25.58 KG/M2 | WEIGHT: 163 LBS | HEIGHT: 67 IN | DIASTOLIC BLOOD PRESSURE: 68 MMHG | HEART RATE: 74 BPM | OXYGEN SATURATION: 99 % | SYSTOLIC BLOOD PRESSURE: 122 MMHG | TEMPERATURE: 97.9 F

## 2020-11-04 PROCEDURE — G0439 PPPS, SUBSEQ VISIT: HCPCS | Performed by: NURSE PRACTITIONER

## 2020-11-04 RX ORDER — CALCITRIOL 0.25 UG/1
0.25 CAPSULE, LIQUID FILLED ORAL
COMMUNITY
Start: 2020-10-12

## 2020-11-04 ASSESSMENT — PATIENT HEALTH QUESTIONNAIRE - PHQ9
SUM OF ALL RESPONSES TO PHQ QUESTIONS 1-9: 0
SUM OF ALL RESPONSES TO PHQ QUESTIONS 1-9: 0
2. FEELING DOWN, DEPRESSED OR HOPELESS: 0
1. LITTLE INTEREST OR PLEASURE IN DOING THINGS: 0
SUM OF ALL RESPONSES TO PHQ9 QUESTIONS 1 & 2: 0
SUM OF ALL RESPONSES TO PHQ QUESTIONS 1-9: 0

## 2020-11-04 ASSESSMENT — LIFESTYLE VARIABLES: HOW OFTEN DO YOU HAVE A DRINK CONTAINING ALCOHOL: 0

## 2020-11-04 NOTE — PROGRESS NOTES
Medicare Annual Wellness Visit  Name: Joy Watson Date: 2020   MRN: <[id number]> Sex: Male   Age: 80 y.o. Ethnicity: Non-/Non    : 1934 Race: Antonino Merino is here for Medicare AWV    Screenings for behavioral, psychosocial and functional/safety risks, and cognitive dysfunction are all negative except as indicated below. These results, as well as other patient data from the [address] form, are documented in Flowsheets linked to this Encounter. Allergies   Allergen Reactions    Warfarin Itching         Prior to Visit Medications    Medication Sig Taking? Authorizing Provider   calcitRIOL (ROCALTROL) 0.25 MCG capsule Take 0.25 mcg by mouth Yes Historical Provider, MD   metoprolol tartrate (LOPRESSOR) 25 MG tablet TAKE 1 TABLET BY MOUTH 2 TIMES DAILY Yes HERNESTO Collins CNP   Donepezil HCl (ARICEPT) 23 MG TABS tablet TAKE 1 TABLET BY MOUTH NIGHTLY Yes Samy Lora MD   ALPRAZolam (XANAX) 0.5 MG tablet TAKE 1 TABLET BY MOUTH 2 TIMES DAILY AS NEEDED FOR SLEEP OR ANXIETY (REDUCE DOSE AS TOLERATED) FOR UP TO 30 DAYS.  Yes Samy Lora MD   mirtazapine (REMERON) 30 MG tablet TAKE 1 TABLET BY MOUTH NIGHTLY Yes HERNESTO Collins CNP   levothyroxine (SYNTHROID) 25 MCG tablet TAKE 1 TABLET BY MOUTH EVERY DAY Yes Samy Lora MD   amiodarone (CORDARONE) 200 MG tablet TAKE 1/2 TABLETS BY MOUTH DAILY Yes HERNESTO Collins CNP   ELIQUIS 5 MG TABS tablet TAKE 1 TABLET BY MOUTH 2 TIMES DAILY Yes HERNESTO Collins CNP   aspirin 325 MG tablet Take 325 mg by mouth daily Yes Historical Provider, MD   diphenhydrAMINE-APAP, sleep, (TYLENOL PM EXTRA STRENGTH PO) Take by mouth Yes Historical Provider, MD   omeprazole (PRILOSEC) 20 MG delayed release capsule TAKE 1 CAPSULE BY MOUTH DAILY Yes Samy Lora MD   sacubitril-valsartan (ENTRESTO) 24-26 MG per tablet Take 1 tablet by mouth 2 times daily Yes Historical Provider, MD   Multiple Vitamins-Minerals (GNP MENS MULTIPLUS PO) Take by mouth Yes Historical Provider, MD   vitamin B-12 (CYANOCOBALAMIN) 500 MCG tablet Take 500 mcg by mouth daily Yes Historical Provider, MD         Past Medical History:   Diagnosis Date    Arthritis     Atrial fibrillation ([facility] [address] atrial fibrillation, pacer    Cardiomyopathy ([facility]     Cataract     Chronic kidney disease (CKD), stage IV (severe) ([facility]     Clostridium difficile infection 2018    Congestive heart failure (CHF) ([facility] [address]     Elective replacement indicated for implantable cardioverter-defibrillator (ICD) 2019    Replacement w/dual-chamber pacemaker pulse generator/TCH/Dr Shilo Prince Gout        Past Surgical History:   Procedure Laterality Date    APPENDECTOMY      CARDIAC DEFIBRILLATOR PLACEMENT      CARDIAC SURGERY Left     17    CATARACT REMOVAL WITH IMPLANT Right 2017    [facility]/Dr Reina Garcia    CHOLECYSTECTOMY      HERNIA REPAIR Bilateral     PACEMAKER INSERTION      [place] OFFICE/OUTPT VISIT,PROCEDURE ONLY N/A 2018    FLEX.  SIG NF performed by Cynthia Montoya MD at [facility] MICROBIOTA FOR INSTILLATION  10/17/2018    COLONOSCOPY W/FECAL TRANSPLANT performed by Cynthia Montoya MD at [address] [place] [address]           Family History   Problem Relation Age of Onset    Heart Disease Mother         abn valve    Coronary Art Dis Father          of MI    Stroke Sister     Hypertension Sister     Heart Attack Son 52    Hypertension Grandchild        CareTeam (Including outside providers/suppliers regularly involved in providing care):   Patient Care Team:  Sathish Iniguez MD as PCP - General (Family Medicine)  Sathish Iniguez MD as PCP - [facility] Empaneled Provider    Wt Readings from Last 3 Encounters:   20 163 lb (73.9 kg)   20 166 lb 9.6 oz (75.6 kg)   19 160 lb 12.8 oz (72.9 kg)     Vitals:    20 1414   BP: 122/68   Pulse: 74   Temp: 97.9 °F (36.6 °C)   SpO2: 99%   Weight: 163 lb (73.9 kg)   Height: 5' 7\" (1.702 m)     Body mass index is 25.53 kg/m². Based upon direct observation of the patient, evaluation of cognition reveals he could not remember three words. He is still driving and states he does not get lost. Wife nods he is having memory issues. Wife noticed more memory issues and its getting worse. He got his driving test a year ago and got 100% per patient. He has a cell phone he carries with him. He does not drive at night. General Appearance: alert and oriented to person, place and time, well developed and well- nourished, in no acute distress  Skin: warm and dry, no rash or erythema  Head: normocephalic and atraumatic  ENT: Hard of hearing  Eyes: pupils equal, round, and reactive to light, extraocular eye movements intact, conjunctivae normal  Neck: supple and non-tender without mass, no thyromegaly or thyroid nodules, no cervical lymphadenopathy  Pulmonary/Chest: clear to auscultation bilaterally- no wheezes, rales or rhonchi, normal air movement, no respiratory distress  Abdomen: soft, non-tender, non-distended, normal bowel sounds, no masses or organomegaly  Extremities: no cyanosis, clubbing or edema  Musculoskeletal: normal range of motion, no joint swelling, deformity or tenderness  Neuro: memory issues    Patient's complete Health Risk Assessment and screening values have been reviewed and are found in Flowsheets. The following problems were reviewed today and where indicated follow up appointments were made and/or referrals ordered. Positive Risk Factor Screenings with Interventions:     Cognitive: Words recalled: 0 Words Recalled  Clock Drawing Test (CDT) Score: (!) Abnormal  Total Score Interpretation: Positive Mini-Cog  Cognitive Impairment Interventions:  · Patient advised to follow-up in this office for further evaluation and treatment within 2 month(s)   · -Three sisters all had dementia.      Health Habits/Nutrition:  Health Habits/Nutrition  Do you exercise for at least 20 minutes 2-3 times per week?: Yes  Have you lost any weight without trying in the past 3 months?: No  Do you eat fewer than 2 meals per day?: (!) Yes  Have you seen a dentist within the past year?: (!) No  Body mass index: (!) 25.53  Health Habits/Nutrition Interventions:  · Nutritional issues:  educational materials for healthy, well-balanced diet provided, will give boost in the office    Hearing/Vision:  No exam data present  Hearing/Vision  Do you or your family notice any trouble with your hearing?: (!) Yes  Do you have difficulty driving, watching TV, or doing any of your daily activities because of your eyesight?: No  Have you had an eye exam within the past year?: (!) No  Hearing/Vision Interventions:  · Hearing concerns:  would like to be evaluated for hearing aids  · Vision concerns:  patient encouraged to make appointment with his/her eye specialist    ADL:  ADLs  In the past 7 days, did you need help from others to perform any of the following everyday activities? Eating, dressing, grooming, bathing, toileting, or walking/balance?: None  In the past 7 days, did you need help from others to take care of any of the following? Laundry, housekeeping, banking/finances, shopping, telephone use, food preparation, transportation, or taking medications?: (!) Laundry, Housekeeping, Banking/Finances  ADL Interventions:  · has mentally challenged grandson move in apartment next door. Grandson works 20 hours a week at [employer] which helps. Wife is taking care of  and grandson. Wife needs some assistance.    Will place Viki Velasco Referral.     Personalized Preventive Plan   Current Health Maintenance Status  Immunization History   Administered Date(s) Administered    Influenza Virus Vaccine 10/01/2012, 10/11/2016    Influenza, High Dose (Fluzone 65 yrs and older) 11/07/2017    Influenza, Quadv, 6 mo and older, IM, PF (Flulaval, Fluarix) 09/19/2018    Influenza, Quadv, IM, (6 mo and older Fluzone, Flulaval, Fluarix and 3 yrs and older Afluria) 09/30/2020    Pneumococcal Conjugate 13-valent ([id number]) 12/13/2016    Pneumococcal Polysaccharide ([id number]) 03/19/2013        Health Maintenance   Topic Date Due    DTaP/Tdap/Td vaccine (1 - Tdap) 06/16/1953    Shingles Vaccine (1 of 2) 06/16/1984    Annual Wellness Visit (AWV)  05/29/2019    TSH testing  08/06/2021    Potassium monitoring  10/07/2021    Creatinine monitoring  10/07/2021    Flu vaccine  Completed    Pneumococcal 65+ yrs at Risk Vaccine  Completed    Hepatitis A vaccine  Aged Out    Hepatitis B vaccine  Aged Out    Hib vaccine  Aged Out    Meningococcal (ACWY) vaccine  Aged Out     Recommendations for [employer] Due: see orders and patient instructions/AVS.  . Recommended screening schedule for the next 5-10 years is provided to the patient in written form: see Patient Eitan Goodrich was seen today for medicare awv. Diagnoses and all orders for this visit:    Routine general medical examination at a health care facility  -     [address] [place] [address], [facility]    Memory change  -     Susan Senior MD, Neurology, [address] [facility]    Family history of dementia  -     AFL - Kings Barnard MD, Neurology, [address] [place], [place], [facility]    Decreased hearing of both ears  -     External Referral To Audiology              Arthritis in knees and lower back. He has tried chiropractic therapy. He would like some arthritis medication. With his kidney disease he can only be on tylenol, ice and heat. Do not put Voltaren gel on back or neck.

## 2020-11-04 NOTE — PATIENT INSTRUCTIONS
Personalized Preventive Plan for Hyacinth Galeano - 11/4/2020  Medicare offers a range of preventive health benefits. Some of the tests and screenings are paid in full while other may be subject to a deductible, co-insurance, and/or copay. Some of these benefits include a comprehensive review of your medical history including lifestyle, illnesses that may run in your family, and various assessments and screenings as appropriate. After reviewing your medical record and screening and assessments performed today your provider may have ordered immunizations, labs, imaging, and/or referrals for you. A list of these orders (if applicable) as well as your Preventive Care list are included within your After Visit Summary for your review. Other Preventive Recommendations:    · A preventive eye exam performed by an eye specialist is recommended every 1-2 years to screen for glaucoma; cataracts, macular degeneration, and other eye disorders. · A preventive dental visit is recommended every 6 months. · Try to get at least 150 minutes of exercise per week or 10,000 steps per day on a pedometer . · Order or download the FREE \"Exercise & Physical Activity: Your Everyday Guide\" from [employer]. Call 6-612.902.4171 or search [employer] online. · You need 2347-1377 mg of calcium and 3411-9668 IU of vitamin D per day. It is possible to meet your calcium requirement with diet alone, but a vitamin D supplement is usually necessary to meet this goal.  · When exposed to the sun, use a sunscreen that protects against both UVA and UVB radiation with an SPF of 30 or greater. Reapply every 2 to 3 hours or after sweating, drying off with a towel, or swimming. · Always wear a seat belt when traveling in a car. Always wear a helmet when riding a bicycle or motorcycle.

## 2020-11-05 ENCOUNTER — TELEPHONE (OUTPATIENT)
Dept: PRIMARY CARE CLINIC | Age: 85
End: 2020-11-05

## 2020-11-05 NOTE — TELEPHONE ENCOUNTER
SW Contact to caregiver/spouse of pt. Per referral to discuss respite and caregiver resources. Caregiver declined referrals to [address] [place] [address] on [employer] for assistance at home and [employer] for care consult with a nurse or a . She did agree to receiving a packet of information about programs and services from the [facility]. Caregiver also stated she has access to [employer] if she would use it when patient progresses, stated he performs hs own bathing, drives and dresses self. Encouraged her to contact grandson's DDS care manager to pursue transportation options for winter months as she is currently taking him to work at [employer] in [place].

## 2020-11-30 ENCOUNTER — TELEPHONE (OUTPATIENT)
Dept: FAMILY MEDICINE CLINIC | Age: 85
End: 2020-11-30

## 2020-11-30 RX ORDER — ALPRAZOLAM 0.5 MG/1
0.5 TABLET ORAL 2 TIMES DAILY PRN
Qty: 60 TABLET | Refills: 0 | Status: SHIPPED | OUTPATIENT
Start: 2020-11-30 | End: 2020-12-28

## 2020-11-30 NOTE — TELEPHONE ENCOUNTER
Pt wife calling to let you know pt did see Dr Karsten Ireland at [address] [place] [address] [address] neuro. 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 David Ocampo ordered bw and said possibly needs head ct.  However wife does not feel it is necessary to have ct since we already know he has changes and dementia

## 2020-12-09 RX ORDER — LEVOTHYROXINE SODIUM 0.03 MG/1
TABLET ORAL
Qty: 30 TABLET | Refills: 2 | Status: SHIPPED | OUTPATIENT
Start: 2020-12-09 | End: 2021-03-09

## 2020-12-28 RX ORDER — ALPRAZOLAM 0.5 MG/1
0.5 TABLET ORAL 2 TIMES DAILY PRN
Qty: 60 TABLET | Refills: 0 | Status: SHIPPED | OUTPATIENT
Start: 2020-12-30 | End: 2021-01-28

## 2021-01-28 DIAGNOSIS — F41.9 ANXIETY: ICD-10-CM

## 2021-01-28 RX ORDER — ALPRAZOLAM 0.5 MG/1
0.5 TABLET ORAL 2 TIMES DAILY PRN
Qty: 60 TABLET | Refills: 0 | Status: SHIPPED | OUTPATIENT
Start: 2021-01-28 | End: 2021-03-01

## 2021-01-28 NOTE — TELEPHONE ENCOUNTER
Date of last refill of this med was 12/30/2020, # of pills given 60 and # of refills given 0. Their next appointment is 0, the last date patient was seen was 11/4/2020. Does patient have medication agreement on file? No  Has drug screen been done in last 12 months if needed?  no

## 2021-01-29 RX ORDER — AMIODARONE HYDROCHLORIDE 200 MG/1
TABLET ORAL
Qty: 45 TABLET | Refills: 3 | Status: SHIPPED | OUTPATIENT
Start: 2021-01-29

## 2021-02-16 NOTE — PROGRESS NOTES
Received fax from [employer] stating that they have denied the Prior Authorization for Rx Symbicort 160-4.5mcg inhaler.The letter also states that we didn't respond to this request. Should I put in a PA for Symbicort, or do we know of another inhaler that would be covered under his plan instead? Let me know about doing a PA.   2.7*   CL  108  110   CO2  22  18*   BUN  50*  45*   CREATININE  2.9*  2.4*     LIVER PROFILE:   Recent Labs      09/18/18   1510   AST  11*   ALT  6*   BILITOT  0.8   ALKPHOS  61     PT/INR:   Recent Labs      09/18/18   1510   PROTIME  17.2*   INR  1.51*       CULTURES    C diff: positive    GI bacterial pathogens: negative     RADIOLOGY    CT ABDOMEN PELVIS WO CONTRAST Additional Contrast? None 9/19/2018   Final Result   Nonspecific colitis, pseudomembranous colitis is considered         XR CHEST STANDARD (2 VW) 9/18/2018   Final Result   Irregular linear opacities are seen within both lungs which may be related to   chronic fibrosis/interstitial disease with mild superimposed airspace disease   not excluded. Assessment/Plan:    Acute on Chronic C diff  - previously completed 2 courses of Dificid  - Admit to Med Surg  - stool cx, check C diff, c diff precautions  - positive occult blood likelyl with colitis  - NPO, IVF, Flagyl and oral vanc started   - GI consulted.  Plans for sigmoidoscopy in am    Hypokalemia  - 2.7  - replaced  - monitor    NAGMA- sec to severe diarrhea  - CO2 18  - IV bicarb fluids  - monitor    MORGAN on CKD stage IV  - baseline Cr ~1.7-2  - Cr on admission: 2.4, improving today at 1.9  - fluids as above  - monitor    Leukocytosis  - 26.5  - 2/2 above  - monitor, repeat 20.5    Atrial Fibrillation  - AC: Eliquis --> dose decreased to 2.5 mg, held for planned scope  - cont amiodarone, Lopressor  - tele    Poor Access  - place  Central line today  - elevated Cr and unable to obtain picc    Poor prognosis  Explained to wife   DVT Prophylaxis: Eliquis  Diet: DIET GENERAL;  Dietary Nutrition Supplements: Frozen Oral Supplement  Code Status: Full Code    Pardeep Shi MD 9/20/2018 7:36 AM

## 2021-03-01 DIAGNOSIS — F41.9 ANXIETY: ICD-10-CM

## 2021-03-01 RX ORDER — ALPRAZOLAM 0.5 MG/1
0.5 TABLET ORAL 2 TIMES DAILY PRN
Qty: 60 TABLET | Refills: 0 | Status: SHIPPED | OUTPATIENT
Start: 2021-03-02 | End: 2021-04-02

## 2021-03-01 NOTE — TELEPHONE ENCOUNTER
Date of last refill of this med was 1/28/21, # of pills given 60 and # of refills given 0. Their next appointment is 0, the last date patient was seen was 11/4/20 awv. Does patient have medication agreement on file? No  Has drug screen been done in last 12 months if needed?  no

## 2021-03-09 RX ORDER — MIRTAZAPINE 30 MG/1
30 TABLET, FILM COATED ORAL NIGHTLY
Qty: 30 TABLET | Refills: 3 | Status: SHIPPED | OUTPATIENT
Start: 2021-03-09 | End: 2021-04-30

## 2021-03-09 RX ORDER — LEVOTHYROXINE SODIUM 0.03 MG/1
TABLET ORAL
Qty: 90 TABLET | Refills: 3 | Status: SHIPPED | OUTPATIENT
Start: 2021-03-09 | End: 2021-04-30

## 2021-03-09 NOTE — LETTER
[address]  [address] [facility]  Phone: [phone]  Fax: [address], APRN - CNP        March 9, 2021    [address] [place] [address]      Dear Grace Toscano: We recently received a refill request for your medications and upon review you are due for a routine check up. Please contact our office to schedule. If you have any questions or concerns, please don't hesitate to call.     Sincerely,        MEGHA Chaney

## 2021-03-09 NOTE — TELEPHONE ENCOUNTER
Last office visit 6/18/2020, advised to follow-up 0, next appointment 0. Mailed letter in October.  Will mail another letter today

## 2021-03-16 ENCOUNTER — OFFICE VISIT (OUTPATIENT)
Dept: FAMILY MEDICINE CLINIC | Age: 86
End: 2021-03-16
Payer: COMMERCIAL

## 2021-03-16 VITALS
WEIGHT: 157 LBS | DIASTOLIC BLOOD PRESSURE: 76 MMHG | SYSTOLIC BLOOD PRESSURE: 112 MMHG | HEART RATE: 80 BPM | TEMPERATURE: 97 F | BODY MASS INDEX: 24.64 KG/M2 | HEIGHT: 67 IN | OXYGEN SATURATION: 96 %

## 2021-03-16 DIAGNOSIS — N18.4 CHRONIC KIDNEY DISEASE (CKD), STAGE IV (SEVERE) (HCC): Primary | ICD-10-CM

## 2021-03-16 DIAGNOSIS — I25.5 ISCHEMIC CARDIOMYOPATHY: ICD-10-CM

## 2021-03-16 DIAGNOSIS — F41.9 ANXIETY: ICD-10-CM

## 2021-03-16 DIAGNOSIS — I50.9 CHRONIC CONGESTIVE HEART FAILURE, UNSPECIFIED HEART FAILURE TYPE (HCC): ICD-10-CM

## 2021-03-16 DIAGNOSIS — G89.29 CHRONIC RIGHT-SIDED LOW BACK PAIN WITHOUT SCIATICA: ICD-10-CM

## 2021-03-16 DIAGNOSIS — I48.0 PAROXYSMAL ATRIAL FIBRILLATION (HCC): ICD-10-CM

## 2021-03-16 DIAGNOSIS — R26.9 GAIT ABNORMALITY: ICD-10-CM

## 2021-03-16 DIAGNOSIS — Z91.81 AT RISK FOR FALLS: ICD-10-CM

## 2021-03-16 DIAGNOSIS — E03.9 HYPOTHYROIDISM, UNSPECIFIED TYPE: ICD-10-CM

## 2021-03-16 DIAGNOSIS — M54.50 CHRONIC RIGHT-SIDED LOW BACK PAIN WITHOUT SCIATICA: ICD-10-CM

## 2021-03-16 DIAGNOSIS — F03.90 DEMENTIA WITHOUT BEHAVIORAL DISTURBANCE, UNSPECIFIED DEMENTIA TYPE: ICD-10-CM

## 2021-03-16 LAB
T4 FREE: 1.5 NG/DL (ref 0.9–1.8)
TSH SERPL DL<=0.05 MIU/L-ACNC: 2.82 UIU/ML (ref 0.27–4.2)

## 2021-03-16 PROCEDURE — 99214 OFFICE O/P EST MOD 30 MIN: CPT | Performed by: FAMILY MEDICINE

## 2021-03-16 PROCEDURE — 36415 COLL VENOUS BLD VENIPUNCTURE: CPT | Performed by: FAMILY MEDICINE

## 2021-03-16 SDOH — ECONOMIC STABILITY: FOOD INSECURITY: WITHIN THE PAST 12 MONTHS, THE FOOD YOU BOUGHT JUST DIDN'T LAST AND YOU DIDN'T HAVE MONEY TO GET MORE.: NEVER TRUE

## 2021-03-16 SDOH — ECONOMIC STABILITY: TRANSPORTATION INSECURITY
IN THE PAST 12 MONTHS, HAS THE LACK OF TRANSPORTATION KEPT YOU FROM MEDICAL APPOINTMENTS OR FROM GETTING MEDICATIONS?: NO

## 2021-03-16 ASSESSMENT — PATIENT HEALTH QUESTIONNAIRE - PHQ9
SUM OF ALL RESPONSES TO PHQ9 QUESTIONS 1 & 2: 0
SUM OF ALL RESPONSES TO PHQ QUESTIONS 1-9: 0
2. FEELING DOWN, DEPRESSED OR HOPELESS: 0
SUM OF ALL RESPONSES TO PHQ QUESTIONS 1-9: 0

## 2021-03-16 ASSESSMENT — ENCOUNTER SYMPTOMS: SHORTNESS OF BREATH: 0

## 2021-03-16 NOTE — PROGRESS NOTES
Chief Complaint   Patient presents with    Check-Up       HPI:  Mary Jo Miller is a 80 y.o. (: 1934) here today   for check up. HPI  Had fall a few days ago. Last week. Diff getting up. Had to call the squad. Wind seemed to blow him over. Was outside. No injury noted. Seen by nephro last week. Renal fxn and potassium improved. No other changes noted. calcitrol was adjusted to 2x / week. Seen by cardio approx 2 mo ago. No sig changes. eduardo entresto for chf hx. On eliquis to dec stroke risk. Rate controlled w/ metoprolol (afib). Has been seen by neurology. Dx w/ senile dementia. Labs ordered. No other sig changes. Had dev rash on legs and arms. No itch. otc cream has helped. Ongoing issues w/ back pain. Takes tylenol. Does not seem to help much. Patient's medications, allergies, past medical, surgical, social and family histories were reviewed and updated as appropriate. ROS:  Review of Systems   Constitutional: Negative for fever. Respiratory: Negative for shortness of breath. Cardiovascular: Negative for leg swelling. Musculoskeletal: Positive for gait problem. Skin: Positive for rash. Psychiatric/Behavioral: Positive for confusion.            Microscopic Examination (no units)   Date Value   2018 Not Indicated     LDL Calculated (mg/dL)   Date Value   2018 84       Past Medical History:   Diagnosis Date    Arthritis     Atrial fibrillation (HCC) atrial fibrillation, pacer    Cardiomyopathy ([facility] [address]     Cataract     Chronic kidney disease (CKD), stage IV (severe) (HCC)     Clostridium difficile infection 2018    Congestive heart failure (CHF) (HCC)     Elective replacement indicated for implantable cardioverter-defibrillator (ICD) 2019    Replacement w/dual-chamber pacemaker pulse generator/TCH/Dr Cuco France Gout        Family History   Problem Relation Age of Onset    Heart Disease Mother         abn valve    Coronary Art Dis Father          of MI    Stroke Sister     Hypertension Sister     Heart Attack Son 52    Hypertension Grandchild        Social History     Socioeconomic History    Marital status:      Spouse name: Not on file    Number of children: Not on file    Years of education: Not on file    Highest education level: Not on file   Occupational History    Not on file   Social Needs    Financial resource strain: Not hard at all   [place] insecurity     Worry: Never true     Inability: Never true   [employer] needs     Medical: No     Non-medical: No   Tobacco Use    Smoking status: Never Smoker    Smokeless tobacco: Never Used   Substance and Sexual Activity    Alcohol use: No    Drug use: No    Sexual activity: Yes     Partners: Female   Lifestyle    Physical activity     Days per week: Not on file     Minutes per session: Not on file    Stress: Not on file   Relationships    Social connections     Talks on phone: Not on file     Gets together: Not on file     Attends Yarsanism service: Not on file     Active member of club or organization: Not on file     Attends meetings of clubs or organizations: Not on file     Relationship status: Not on file    Intimate partner violence     Fear of current or ex partner: Not on file     Emotionally abused: Not on file     Physically abused: Not on file     Forced sexual activity: Not on file   Other Topics Concern    Not on file   Social History Narrative    Not on file       Prior to Visit Medications    Medication Sig Taking? Authorizing Provider   mirtazapine (REMERON) 30 MG tablet TAKE 1 TABLET BY MOUTH NIGHTLY Yes HERNESTO Hammond CNP   levothyroxine (SYNTHROID) 25 MCG tablet TAKE 1 TABLET BY MOUTH EVERY DAY Yes HERNESTO Hammond CNP   ALPRAZolam Rainell Lowers) 0.5 MG tablet Take 1 tablet by mouth 2 times daily as needed for Sleep or Anxiety (reduce dose as tolerated) for up to 30 days.  Yes HERNESTO Hammond CNP amiodarone (CORDARONE) 200 MG tablet TAKE 1/2 TABLETS BY MOUTH DAILY Yes HERNESTO Draper CNP   calcitRIOL (ROCALTROL) 0.25 MCG capsule Take 0.25 mcg by mouth Twice a Week  Yes Historical Provider, MD   metoprolol tartrate (LOPRESSOR) 25 MG tablet TAKE 1 TABLET BY MOUTH 2 TIMES DAILY Yes HERNESTO Draper CNP   Donepezil HCl (ARICEPT) 23 MG TABS tablet TAKE 1 TABLET BY MOUTH NIGHTLY Yes Sony Wallis MD   ELIQUIS 5 MG TABS tablet TAKE 1 TABLET BY MOUTH 2 TIMES DAILY Yes HERNESTO Draper CNP   aspirin 325 MG tablet Take 325 mg by mouth daily Yes Historical Provider, MD   omeprazole (PRILOSEC) 20 MG delayed release capsule TAKE 1 CAPSULE BY MOUTH DAILY Yes Sony Wallis MD   sacubitril-valsartan (ENTRESTO) 24-26 MG per tablet Take 1 tablet by mouth 2 times daily Yes Historical Provider, MD   Multiple Vitamins-Minerals (GNP MENS MULTIPLUS PO) Take by mouth Yes Historical Provider, MD   vitamin B-12 (CYANOCOBALAMIN) 500 MCG tablet Take 500 mcg by mouth daily Yes Historical Provider, MD   diphenhydrAMINE-APAP, sleep, (TYLENOL PM EXTRA STRENGTH PO) Take by mouth  Historical Provider, MD       Allergies   Allergen Reactions    Warfarin Itching       OBJECTIVE:    /76   Pulse 80   Temp 97 °F (36.1 °C)   Ht 5' 7\" (1.702 m)   Wt 157 lb (71.2 kg)   SpO2 96%   BMI 24.59 kg/m²     BP Readings from Last 2 Encounters:   03/16/21 112/76   11/04/20 122/68       Wt Readings from Last 3 Encounters:   03/16/21 157 lb (71.2 kg)   11/04/20 163 lb (73.9 kg)   06/18/20 166 lb 9.6 oz (75.6 kg)       Physical Exam  Constitutional:       Appearance: He is well-developed. HENT:      Head: Normocephalic and atraumatic. Eyes:      Conjunctiva/sclera: Conjunctivae normal.   Neck:      Trachea: No tracheal deviation. Cardiovascular:      Rate and Rhythm: Normal rate and regular rhythm. Heart sounds: Murmur present. Systolic murmur present with a grade of 2/6. No friction rub. No gallop.     Pulmonary: Effort: Pulmonary effort is normal.      Breath sounds: Normal breath sounds. Abdominal:      Palpations: Abdomen is soft. Tenderness: There is no abdominal tenderness. Musculoskeletal:      Right lower leg: Edema (tr) present. Left lower leg: Edema (tr) present. Skin:     General: Skin is warm and dry. Neurological:      Mental Status: He is alert and oriented to person, place, and time. Psychiatric:         Mood and Affect: Mood normal.         Behavior: Behavior normal.           ASSESSMENT/PLAN:    1. Chronic kidney disease (CKD), stage IV (severe) (HCC)  Reviewed note and labs from nephro. Improved labs. 2. Ischemic cardiomyopathy  Reviewed cardio note. Cont meds. Overall improved. 3. Paroxysmal atrial fibrillation (HCC)  Seems to be nsr. Rate controlled. 4. Chronic congestive heart failure, unspecified heart failure type ([facility] [address]  See above. 5. Dementia without behavioral disturbance, unspecified dementia type ([facility] [address]  Near baseline. Has been seen by neurology. 6. Hypothyroidism, unspecified type  Rpt labs, amanda since on amiodarone.    - TSH without Reflex  - T4, Free    7. Anxiety  Refill meds when due    8. Chronic right-sided low back pain without sciatica  Consider PT. Cannot take nsaid due to bleeding risk and renal fxn. Cont tylenol. 9. Gait abnormality  Cont walker. Considering PT    10. At risk for falls  See above.

## 2021-04-05 ENCOUNTER — TELEPHONE (OUTPATIENT)
Dept: FAMILY MEDICINE CLINIC | Age: 86
End: 2021-04-05

## 2021-04-05 DIAGNOSIS — I42.9 CARDIOMYOPATHY, UNSPECIFIED TYPE (HCC): ICD-10-CM

## 2021-04-05 NOTE — TELEPHONE ENCOUNTER
[facility] with home care called stating pt had fall yesterday no injuries. bp has been 103/68, 118/6/ and 106/64. Also wanting to know if an appetite stimulant could be rx'd.  Stated he doesn't want to eat or drink much at all

## 2021-04-08 ENCOUNTER — TELEPHONE (OUTPATIENT)
Dept: FAMILY MEDICINE CLINIC | Age: 86
End: 2021-04-08

## 2021-04-08 DIAGNOSIS — I48.0 PAROXYSMAL ATRIAL FIBRILLATION (HCC): ICD-10-CM

## 2021-04-08 DIAGNOSIS — R26.9 GAIT ABNORMALITY: ICD-10-CM

## 2021-04-08 DIAGNOSIS — N18.4 CHRONIC KIDNEY DISEASE (CKD), STAGE IV (SEVERE) (HCC): Primary | ICD-10-CM

## 2021-04-08 DIAGNOSIS — F03.90 DEMENTIA WITHOUT BEHAVIORAL DISTURBANCE, UNSPECIFIED DEMENTIA TYPE: ICD-10-CM

## 2021-04-08 NOTE — TELEPHONE ENCOUNTER
Anything we can to to help. He needs too much care to be home but they cant afford to private pay. Any assistance they would qualify for. Very nice couple that needs help.

## 2021-04-09 ENCOUNTER — TELEPHONE (OUTPATIENT)
Dept: PRIMARY CARE CLINIC | Age: 86
End: 2021-04-09

## 2021-04-09 NOTE — TELEPHONE ENCOUNTER
This caregiver is known to me. See notes 11/5/20 I can reach out again. Seems she did not take advantage of resources.

## 2021-04-13 ENCOUNTER — TELEPHONE (OUTPATIENT)
Dept: PRIMARY CARE CLINIC | Age: 86
End: 2021-04-13

## 2021-04-14 ENCOUNTER — TELEPHONE (OUTPATIENT)
Dept: FAMILY MEDICINE CLINIC | Age: 86
End: 2021-04-14

## 2021-04-15 ENCOUNTER — TELEPHONE (OUTPATIENT)
Dept: PRIMARY CARE CLINIC | Age: 86
End: 2021-04-15

## 2021-04-15 NOTE — TELEPHONE ENCOUNTER
Spoke with wife. She was very confused as to what palliative care is vs the home care services that [facility] is providing. I think that I explained it to her where she can understand now.    HANNA

## 2021-04-21 ENCOUNTER — TELEPHONE (OUTPATIENT)
Dept: FAMILY MEDICINE CLINIC | Age: 86
End: 2021-04-21

## 2021-04-21 NOTE — TELEPHONE ENCOUNTER
Spoke with Marshall Smith she thinks wife was going to take  Patient to the ed, I tried to call x2 no answer

## 2021-04-29 DIAGNOSIS — F03.90 DEMENTIA WITHOUT BEHAVIORAL DISTURBANCE, UNSPECIFIED DEMENTIA TYPE: ICD-10-CM

## 2021-04-29 RX ORDER — DONEPEZIL HYDROCHLORIDE 23 MG/1
23 TABLET, FILM COATED ORAL NIGHTLY
Qty: 30 TABLET | Refills: 4 | Status: SHIPPED | OUTPATIENT
Start: 2021-04-29

## 2021-04-30 ENCOUNTER — OFFICE VISIT (OUTPATIENT)
Dept: FAMILY MEDICINE CLINIC | Age: 86
End: 2021-04-30
Payer: COMMERCIAL

## 2021-04-30 VITALS
OXYGEN SATURATION: 92 % | DIASTOLIC BLOOD PRESSURE: 84 MMHG | HEART RATE: 113 BPM | BODY MASS INDEX: 24.01 KG/M2 | HEIGHT: 67 IN | WEIGHT: 153 LBS | SYSTOLIC BLOOD PRESSURE: 132 MMHG

## 2021-04-30 DIAGNOSIS — Z91.81 AT RISK FOR FALLS: ICD-10-CM

## 2021-04-30 DIAGNOSIS — F03.90 DEMENTIA WITHOUT BEHAVIORAL DISTURBANCE, UNSPECIFIED DEMENTIA TYPE: ICD-10-CM

## 2021-04-30 DIAGNOSIS — G89.29 CHRONIC RIGHT-SIDED LOW BACK PAIN WITHOUT SCIATICA: ICD-10-CM

## 2021-04-30 DIAGNOSIS — N18.4 CHRONIC KIDNEY DISEASE (CKD), STAGE IV (SEVERE) (HCC): ICD-10-CM

## 2021-04-30 DIAGNOSIS — K92.1 BLACK STOOL: ICD-10-CM

## 2021-04-30 DIAGNOSIS — R26.9 GAIT ABNORMALITY: Primary | ICD-10-CM

## 2021-04-30 DIAGNOSIS — M54.50 CHRONIC RIGHT-SIDED LOW BACK PAIN WITHOUT SCIATICA: ICD-10-CM

## 2021-04-30 DIAGNOSIS — I25.5 ISCHEMIC CARDIOMYOPATHY: ICD-10-CM

## 2021-04-30 DIAGNOSIS — R09.89 OTHER SPECIFIED SYMPTOMS AND SIGNS INVOLVING THE CIRCULATORY AND RESPIRATORY SYSTEMS: ICD-10-CM

## 2021-04-30 DIAGNOSIS — I48.0 PAROXYSMAL ATRIAL FIBRILLATION (HCC): ICD-10-CM

## 2021-04-30 LAB
A/G RATIO: 1.4 (ref 1.1–2.2)
ALBUMIN SERPL-MCNC: 4.2 G/DL (ref 3.4–5)
ALP BLD-CCNC: 56 U/L (ref 40–129)
ALT SERPL-CCNC: 6 U/L (ref 10–40)
ANION GAP SERPL CALCULATED.3IONS-SCNC: 14 MMOL/L (ref 3–16)
AST SERPL-CCNC: 16 U/L (ref 15–37)
BASOPHILS ABSOLUTE: 0.1 K/UL (ref 0–0.2)
BASOPHILS RELATIVE PERCENT: 1.3 %
BILIRUB SERPL-MCNC: 0.5 MG/DL (ref 0–1)
BUN BLDV-MCNC: 51 MG/DL (ref 7–20)
CALCIUM SERPL-MCNC: 8.8 MG/DL (ref 8.3–10.6)
CHLORIDE BLD-SCNC: 108 MMOL/L (ref 99–110)
CO2: 20 MMOL/L (ref 21–32)
CREAT SERPL-MCNC: 2.4 MG/DL (ref 0.8–1.3)
EOSINOPHILS ABSOLUTE: 0.6 K/UL (ref 0–0.6)
EOSINOPHILS RELATIVE PERCENT: 7.4 %
GFR AFRICAN AMERICAN: 31
GFR NON-AFRICAN AMERICAN: 26
GLOBULIN: 2.9 G/DL
GLUCOSE BLD-MCNC: 101 MG/DL (ref 70–99)
HCT VFR BLD CALC: 31.1 % (ref 40.5–52.5)
HEMOGLOBIN: 10.4 G/DL (ref 13.5–17.5)
LYMPHOCYTES ABSOLUTE: 1.1 K/UL (ref 1–5.1)
LYMPHOCYTES RELATIVE PERCENT: 13.1 %
MCH RBC QN AUTO: 33.2 PG (ref 26–34)
MCHC RBC AUTO-ENTMCNC: 33.3 G/DL (ref 31–36)
MCV RBC AUTO: 99.8 FL (ref 80–100)
MONOCYTES ABSOLUTE: 1 K/UL (ref 0–1.3)
MONOCYTES RELATIVE PERCENT: 10.9 %
NEUTROPHILS ABSOLUTE: 5.9 K/UL (ref 1.7–7.7)
NEUTROPHILS RELATIVE PERCENT: 67.3 %
PDW BLD-RTO: 13.7 % (ref 12.4–15.4)
PLATELET # BLD: 326 K/UL (ref 135–450)
PMV BLD AUTO: 8.2 FL (ref 5–10.5)
POTASSIUM SERPL-SCNC: 4.4 MMOL/L (ref 3.5–5.1)
RBC # BLD: 3.11 M/UL (ref 4.2–5.9)
SODIUM BLD-SCNC: 142 MMOL/L (ref 136–145)
TOTAL PROTEIN: 7.1 G/DL (ref 6.4–8.2)
WBC # BLD: 8.8 K/UL (ref 4–11)

## 2021-04-30 PROCEDURE — 99214 OFFICE O/P EST MOD 30 MIN: CPT | Performed by: FAMILY MEDICINE

## 2021-04-30 PROCEDURE — 36415 COLL VENOUS BLD VENIPUNCTURE: CPT | Performed by: FAMILY MEDICINE

## 2021-04-30 ASSESSMENT — ENCOUNTER SYMPTOMS: SHORTNESS OF BREATH: 0

## 2021-04-30 NOTE — PROGRESS NOTES
Chief Complaint   Patient presents with    Follow-up     Patient here for follow up after defibrillator placement. HPI:  Arvind Forte is a 80 y.o. (: 1934) here today   for follow up after defibrillator placement. Inc diff walking over past few mo. Shuffling gait vs lack of movement. Has had \"black stools. \"  Also had bleeding hemorrhoid. Has been off iron for a little over a week. Last labs in hospital.  Last renal fxn improved. Has had diff w/ urinary and bowel incontinence. Not new. Ongoing issues. Recently presented to hospital w/ syncope. Pacer showed VT. Pacemaker explanted. Defibrillator placed. Next cardio appt this summer. No concerns w/ wound, no excessive redness or drainage noted. PT has been coming out 2x per week. Nurse weekly as well. HPI    Patient's medications, allergies, past medical, surgical, social and family histories were reviewed and updated asappropriate. ROS:  Review of Systems   Constitutional: Negative for fever. Respiratory: Negative for shortness of breath. Cardiovascular: Negative for leg swelling. Musculoskeletal: Positive for gait problem. Prior to Visit Medications    Medication Sig Taking? Authorizing Provider   Donepezil HCl (ARICEPT) 23 MG TABS tablet TAKE 1 TABLET BY MOUTH NIGHTLY Yes Virgie Moreno MD   metoprolol tartrate (LOPRESSOR) 25 MG tablet TAKE 1 TABLET BY MOUTH 2 TIMES DAILY Yes HERNESTO Pratt CNP   ALPRAZolam (XANAX) 0.5 MG tablet TAKE 1 TABLET BY MOUTH 2 TIMES DAILY AS NEEDED FOR SLEEP OR ANXIETY (REDUCE DOSE AS TOLERATED) FOR UP TO 30 DAYS.  Yes Virgie Moreno MD   mirtazapine (REMERON) 30 MG tablet TAKE 1 TABLET BY MOUTH NIGHTLY Yes HERNESTO Pratt CNP   levothyroxine (SYNTHROID) 25 MCG tablet TAKE 1 TABLET BY MOUTH EVERY DAY Yes HERNESTO Pratt CNP   amiodarone (CORDARONE) 200 MG tablet TAKE 1/2 TABLETS BY MOUTH DAILY  Patient taking differently: Take 400 mg by mouth daily  Yes HERNESTO Worthington CNP   calcitRIOL (ROCALTROL) 0.25 MCG capsule Take 0.25 mcg by mouth Twice a Week  Yes Historical Provider, MD   ELIQUIS 5 MG TABS tablet TAKE 1 TABLET BY MOUTH 2 TIMES DAILY Yes HERNESTO Worthington CNP   aspirin 325 MG tablet Take 325 mg by mouth daily Yes Historical Provider, MD   diphenhydrAMINE-APAP, sleep, (TYLENOL PM EXTRA STRENGTH PO) Take by mouth Yes Historical Provider, MD   omeprazole (PRILOSEC) 20 MG delayed release capsule TAKE 1 CAPSULE BY MOUTH DAILY Yes Jose Miguel Fox MD   Multiple Vitamins-Minerals (GNP MENS MULTIPLUS PO) Take by mouth Yes Historical Provider, MD   vitamin B-12 (CYANOCOBALAMIN) 500 MCG tablet Take 500 mcg by mouth daily Yes Historical Provider, MD       Allergies   Allergen Reactions    Warfarin Itching       OBJECTIVE:    /84   Pulse 113   Ht 5' 7\" (1.702 m)   Wt 153 lb (69.4 kg)   SpO2 92%   BMI 23.96 kg/m²     BP Readings from Last 2 Encounters:   04/30/21 132/84   03/16/21 112/76       Wt Readings from Last 3 Encounters:   04/30/21 153 lb (69.4 kg)   03/16/21 157 lb (71.2 kg)   11/04/20 163 lb (73.9 kg)       Physical Exam  Constitutional:       Appearance: He is well-developed. HENT:      Head: Normocephalic and atraumatic. Eyes:      Conjunctiva/sclera: Conjunctivae normal.   Neck:      Trachea: No tracheal deviation. Cardiovascular:      Rate and Rhythm: Normal rate and regular rhythm. Pulses:           Posterior tibial pulses are 1+ on the right side and 1+ on the left side. Heart sounds: Murmur present. Systolic murmur present with a grade of 2/6. No friction rub. No gallop. Comments: Faint PT pulses bilat  Pulmonary:      Effort: Pulmonary effort is normal.      Breath sounds: Normal breath sounds. Abdominal:      Palpations: Abdomen is soft. Tenderness: There is no abdominal tenderness. Musculoskeletal:      Right lower leg: No edema. Left lower leg: No edema.    Skin:     General: Skin is warm and dry. Neurological:      Mental Status: He is alert and oriented to person, place, and time. Psychiatric:         Mood and Affect: Mood normal.         Behavior: Behavior normal.     wound from recent defib placement clean, dry, intact. No evidence of infxn. ASSESSMENT/PLAN:     1. Gait abnormality  Ongoing issues. Had been seen by neuro in the past.  Concern for neuropathy along w/ other ongoing issues. Given dec pulses and sxs, arrange arterial dopplers. Cont PT. Back to neuro if ongoing issues. - VL DUP LOWER EXTREMITY ARTERIES BILATERAL; Future    2. Chronic kidney disease (CKD), stage IV (severe) (HCC)  Improved last check. Rpt labs. F/u w/ nephro as sched. - Comprehensive Metabolic Panel  - CBC Auto Differential    3. Dementia without behavioral disturbance, unspecified dementia type ([facility] [address]  Near baseline    4. Ischemic cardiomyopathy  Recent defib placement due to hx of VT. Doing well. No evidence of infxn. - VL DUP LOWER EXTREMITY ARTERIES BILATERAL; Future    5. At risk for falls  See above. 6. Paroxysmal atrial fibrillation (HCC)  F/u w/ cardio as sched. Rate controlled today    7. Black stool  Rpt labs today. May be related to iron. Last hgb stable. 8. Chronic right-sided low back pain without sciatica  Ongoing back pain. Would avoid nsaids given anticoagulation and ckd. 9. Other specified symptoms and signs involving the circulatory and respiratory systems   See above. - VL DUP LOWER EXTREMITY ARTERIES BILATERAL;  Future

## 2021-05-03 ENCOUNTER — TELEPHONE (OUTPATIENT)
Dept: FAMILY MEDICINE CLINIC | Age: 86
End: 2021-05-03

## 2021-05-04 NOTE — RESULT ENCOUNTER NOTE
Arterial dz noted to bilat lower legs. Consider referral to vascular specialist for further eval.  Would be diff to do angiogram due to renal fxn.

## 2021-05-25 ENCOUNTER — TELEPHONE (OUTPATIENT)
Dept: FAMILY MEDICINE CLINIC | Age: 86
End: 2021-05-25

## 2021-05-25 NOTE — TELEPHONE ENCOUNTER
Sandra called. Pt fell yesterday in the LR, squad was called. They helped him into bed and he refused transport. Denies Injury.    HANNA

## 2021-05-26 ENCOUNTER — TELEPHONE (OUTPATIENT)
Dept: FAMILY MEDICINE CLINIC | Age: 86
End: 2021-05-26

## 2021-05-26 NOTE — TELEPHONE ENCOUNTER
Pt had a fall on Sunday. [facility] was there but he refused care. Progressively getting worse. He's having a lot of back pain, rt side area where he hit. She's calling [facility] again.  HANNA

## 2021-06-07 ENCOUNTER — TELEPHONE (OUTPATIENT)
Dept: FAMILY MEDICINE CLINIC | Age: 86
End: 2021-06-07

## 2021-06-07 NOTE — TELEPHONE ENCOUNTER
Wife left a message on the machine Saturday morning stating that Lio Bynum passed away at [facility] on Sat around 515am.

## 2021-06-07 NOTE — TELEPHONE ENCOUNTER
Called and spoke with Prema Gonzáles and he states that patient had been in hospital for 11 days and Dr. Yaimleth Cabrera signed the death certificate.

## 2021-06-07 NOTE — TELEPHONE ENCOUNTER
----- Message from [facility] sent at 2021 10:12 AM EDT -----  Subject: Message to Provider    QUESTIONS  Information for Provider? Patient passed away he was a patient of Dr. Sonal He and he would like to know who should sign his death certificate. He was in [facility] He passed away on . Call Chelle Stockton   back at the [facility] home, he's a cremation so he needs to know as soon as   possible.   ---------------------------------------------------------------------------  --------------  [address]  What is the best way for the office to contact you? OK to leave message on   voicemail  Preferred Call Back Phone Number? [phone]  ---------------------------------------------------------------------------  --------------  SCRIPT ANSWERS  Relationship to Patient? Third Party  Representative Name?  Chelle Stockton with the [facility] home

## 2021-11-26 NOTE — TELEPHONE ENCOUNTER
Date of last refill of this med was 7/18/2018, # of pills given 30 and # of refills given 0. Their next appointment is 10/01/2018, the last date patient was seen was 8/1/2018. Does patient have medication agreement on file? No  Has drug screen been done in last 12 months if needed? no  Has [place] report been ran in last 90 days?  attatched (must input date) 22-Apr-2021

## 2025-05-16 NOTE — TELEPHONE ENCOUNTER
Last office visit 3/16/2021, advised to follow-up 4 months, next appointment 7/15/2021. Second attempt to call patient to schedule a lab appt before 06/04. We were able to leave a voicemail this time with the callback number after two tries.     [facility]  [id number] assisted.

## (undated) DEVICE — ENDO CARRY-ON PROCEDURE KIT INCLUDES SUCTION TUBING, LUBRICANT, GAUZE, BIOHAZARD STICKER, TRANSPORT PAD AND INTERCEPT BEDSIDE KIT.: Brand: ENDO CARRY-ON PROCEDURE KIT